# Patient Record
Sex: FEMALE | Race: WHITE | NOT HISPANIC OR LATINO | Employment: OTHER | URBAN - NONMETROPOLITAN AREA
[De-identification: names, ages, dates, MRNs, and addresses within clinical notes are randomized per-mention and may not be internally consistent; named-entity substitution may affect disease eponyms.]

---

## 2020-01-22 ENCOUNTER — OUTSIDE FACILITY SERVICE (OUTPATIENT)
Dept: CARDIOLOGY | Facility: CLINIC | Age: 70
End: 2020-01-22

## 2020-01-22 PROCEDURE — 93227 XTRNL ECG REC<48 HR R&I: CPT | Performed by: INTERNAL MEDICINE

## 2020-02-27 ENCOUNTER — OFFICE VISIT (OUTPATIENT)
Dept: CARDIOLOGY | Facility: CLINIC | Age: 70
End: 2020-02-27

## 2020-02-27 VITALS
SYSTOLIC BLOOD PRESSURE: 150 MMHG | DIASTOLIC BLOOD PRESSURE: 78 MMHG | HEART RATE: 59 BPM | HEIGHT: 60 IN | BODY MASS INDEX: 21.99 KG/M2 | WEIGHT: 112 LBS

## 2020-02-27 DIAGNOSIS — E78.00 HYPERCHOLESTEREMIA: ICD-10-CM

## 2020-02-27 DIAGNOSIS — I25.6 SILENT MYOCARDIAL ISCHEMIA: ICD-10-CM

## 2020-02-27 DIAGNOSIS — R09.89 BRUIT OF RIGHT CAROTID ARTERY: ICD-10-CM

## 2020-02-27 DIAGNOSIS — R01.1 MURMUR, CARDIAC: ICD-10-CM

## 2020-02-27 DIAGNOSIS — R42 DIZZINESS: ICD-10-CM

## 2020-02-27 DIAGNOSIS — I10 ESSENTIAL HYPERTENSION: ICD-10-CM

## 2020-02-27 DIAGNOSIS — E03.9 HYPOTHYROIDISM, UNSPECIFIED TYPE: ICD-10-CM

## 2020-02-27 DIAGNOSIS — R00.1 BRADYCARDIA, SINUS: Primary | ICD-10-CM

## 2020-02-27 PROCEDURE — 93000 ELECTROCARDIOGRAM COMPLETE: CPT | Performed by: INTERNAL MEDICINE

## 2020-02-27 PROCEDURE — 99204 OFFICE O/P NEW MOD 45 MIN: CPT | Performed by: INTERNAL MEDICINE

## 2020-02-27 RX ORDER — ATORVASTATIN CALCIUM 10 MG/1
10 TABLET, FILM COATED ORAL DAILY
COMMUNITY

## 2020-02-27 RX ORDER — LEVOTHYROXINE SODIUM 0.07 MG/1
75 TABLET ORAL DAILY
COMMUNITY
End: 2021-11-02

## 2020-02-27 RX ORDER — LISINOPRIL 5 MG/1
5 TABLET ORAL DAILY
COMMUNITY
End: 2020-03-20 | Stop reason: DRUGHIGH

## 2020-02-27 NOTE — PROGRESS NOTES
"Chief Complaint   Patient presents with   • Establish Care     Patient referred per PCP for Bradycardia. Had holter monitor at [facility] 1/17/2020.   • Blood pressure     Has had elevated B/P for the last 6 months, PCP started Lisinopril, B/P much better. She states \"when I lay down at night I can hear heart beating and like the blood going through artery\".   • Dizziness     She states \"I have only become dizzy 3-4 times in the last 5 years, but noticed with heat\".   • Aspirin     Patient does not take.        CARDIAC COMPLAINTS  Dizziness and Bradycardia      Subjective   Kenzie Escobar is a 69 y.o. female came in today for her initial cardiac evaluation.  She has history of hypothyroidism diagnosed many years ago and also has borderline hypertension.  She was seen at your office for a routine checkup at that time the blood pressure was elevated and was started on lisinopril.  At that time she also noticed that she has been having some occasional dizziness.  She had about 3-4 times in the last few years.  The first time it happened when she was in [place].  It was in the hot summer when she had the dizziness.  The last episode happened about 2 years ago again it was during the summertime.  The dizziness lasted for about 20 to 30 minutes.  It occurs suddenly she felt lightheaded had some mild nausea but did not throw up also had some diaphoresis and sat down for a while and after that the symptoms got better.  When she was seen at your office she noted to have bradycardia.  She had a Holter monitor placed where the average heart rate was about 66 and she did have some episodes of bradycardia but this happens mostly in the early morning while she was sleeping.  At night or in the evening her heart rate did go up.  She denies having any kind of palpitation.  She also underwent some lab work and found to have a normal TSH and T4 level.  Her blood count was normal.  Her renal function was normal.  Her cholesterol was 256 with " the LDL of 166.  She was started on Lipitor and so far she is tolerated well.  She denies having any kind of chest discomfort.  She denies having any shortness of breath.  She has history of smoking but did quit in 2018.  Prior to that she was smoking about a half a pack a day for the last 45 years.  She does have a family history of coronary artery disease as well as aneurysm in the brain.    Past Surgical History:   Procedure Laterality Date   • CONVERTED (HISTORICAL) HOLTER  2020    @ [facility]. [facility] 70 ..       Current Outpatient Medications   Medication Sig Dispense Refill   • atorvastatin (LIPITOR) 10 MG tablet Take 10 mg by mouth Daily.     • Calcium Carbonate-Vit D-Min (CALCIUM 1200 PO) Take  by mouth Daily.     • Cyanocobalamin (VITAMIN B12 PO) Take  by mouth. One tablet 5 times weekly.     • levothyroxine (SYNTHROID, LEVOTHROID) 75 MCG tablet Take 75 mcg by mouth Daily.     • lisinopril (PRINIVIL,ZESTRIL) 5 MG tablet Take 5 mg by mouth Daily.     • VITAMIN D PO Take  by mouth Daily.       No current facility-administered medications for this visit.            ALLERGIES:  Other and Sulfa antibiotics    Past Medical History:   Diagnosis Date   • Bradycardia    • H/O shoulder surgery     right   • Hyperlipidemia    • Hypertension    • Hypothyroidism        Social History     Tobacco Use   Smoking Status Former Smoker   • Packs/day: 0.50   • Years: 45.00   • Pack years: 22.50   • Types: Cigarettes   • Last attempt to quit:    • Years since quittin.1   Smokeless Tobacco Never Used          Family History   Problem Relation Age of Onset   • Hypertension Mother    • Cerebral aneurysm Mother    • Failure to thrive Father    • Heart disease Sister 73        CABG   • Heart attack Paternal Grandmother 80   • Prostate cancer Paternal Grandfather    • Cancer Sister    • No Known Problems Son        Review of Systems   Constitution: Positive for malaise/fatigue. Negative for decreased appetite.   HENT:  "Negative for congestion and sore throat.    Eyes: Negative for blurred vision.   Cardiovascular: Negative for chest pain and palpitations.   Respiratory: Positive for shortness of breath. Negative for snoring.    Endocrine: Negative for cold intolerance and heat intolerance.   Hematologic/Lymphatic: Negative for adenopathy. Does not bruise/bleed easily.   Skin: Negative for itching, nail changes and skin cancer.   Musculoskeletal: Negative for arthritis and myalgias.   Gastrointestinal: Negative for abdominal pain, dysphagia and heartburn.   Genitourinary: Negative for bladder incontinence and frequency.   Neurological: Positive for dizziness. Negative for light-headedness, seizures and vertigo.   Psychiatric/Behavioral: Negative for altered mental status.   Allergic/Immunologic: Negative for environmental allergies and hives.       Diabetes- No  Thyroid- abnormal    Objective     /78 (BP Location: Left arm)   Pulse 59   Ht 152.4 cm (60\")   Wt 50.8 kg (112 lb)   BMI 21.87 kg/m²     Physical Exam   Constitutional: She is oriented to person, place, and time. She appears well-developed and well-nourished.   HENT:   Head: Normocephalic.   Nose: Nose normal.   Eyes: Pupils are equal, round, and reactive to light. EOM are normal.   Neck: Normal range of motion. Neck supple.   Cardiovascular: Regular rhythm, S1 normal and S2 normal. Bradycardia present.   Murmur heard.  Pulmonary/Chest: Effort normal and breath sounds normal.   Abdominal: Soft. Bowel sounds are normal.   Musculoskeletal: Normal range of motion. She exhibits no edema.   Neurological: She is alert and oriented to person, place, and time.   Skin: Skin is warm and dry.   Psychiatric: She has a normal mood and affect.         ECG 12 Lead  Date/Time: 2/27/2020 1:47 PM  Performed by: Kervin Morrissey MD  Authorized by: Kervin Morrissey MD   Comparison: compared with previous ECG from 1/17/2020  Rhythm: sinus bradycardia  Rate: bradycardic  QRS " axis: normal    Clinical impression: non-specific ECG              Assessment/Plan   Patient's Body mass index is 21.87 kg/m². BMI is within normal parameters. No follow-up required..     Kenzie was seen today for establish care, blood pressure, dizziness and aspirin.    Diagnoses and all orders for this visit:    Bradycardia, sinus  -     Treadmill Stress Test; Future  -     Adult Transthoracic Echo Complete W/ Cont if Necessary Per Protocol; Future    Essential hypertension    Hypercholesteremia    Hypothyroidism, unspecified type    Dizziness  -     US Carotid Bilateral; Future    Bruit of right carotid artery  -     US Carotid Bilateral; Future    Silent myocardial ischemia  -     Treadmill Stress Test; Future    Murmur, cardiac  -     Adult Transthoracic Echo Complete W/ Cont if Necessary Per Protocol; Future       At baseline her heart rate is lower limit of normal.  Her blood pressure is elevated.  She apparently takes the lisinopril in the afternoon.  Her EKG shows sinus bradycardia with nonspecific ST changes normal FL interval no delta waves.  Her clinical examination reveals a BMI around 22.  She does have short systolic murmur at the mitral area lungs were clear extremities had no pedal edema and normal peripheral pulse.     Sinus bradycardia which was noted at your office and even now appears to be asymptomatic.  Her Holter monitor did not show any evidence of pauses or any significant bradycardia.  I did explain to her at this time.  I scheduled her to undergo a regular stress test to evaluate her functional status, chronotropic response and blood pressure response and also look for any stress-induced arrhythmia.    Regarding her blood pressure the blood it is still elevated.  She has not taken the medication correctly.  Strong advice was given to her about it.  In the stress test if her blood pressure goes up then she may need to go up on the medication dose.    Regarding her hypercholesterolemia she  has been tolerating the Lipitor properly.  At this time continue the same.  Recheck it in 1 to 2 months.    She has been having some occasional dizziness and there is a bruit of the right side.  I scheduled her to undergo a carotid ultrasound to evaluate the significance of it.    Regarding the heart murmur and the bradycardia, I scheduled her to undergo an echocardiogram to evaluate the LV function, LVH valvular structures as well as the PA pressure.    Based on the results, further recommendations will be made.             Electronically signed by Kervin Morrissey MD February 27, 2020 1:37 PM

## 2020-03-04 ENCOUNTER — HOSPITAL ENCOUNTER (OUTPATIENT)
Dept: CARDIOLOGY | Facility: HOSPITAL | Age: 70
Discharge: HOME | End: 2020-03-04

## 2020-03-04 ENCOUNTER — HOSPITAL ENCOUNTER (OUTPATIENT)
Dept: CARDIOLOGY | Facility: HOSPITAL | Age: 70
Discharge: HOME | End: 2020-03-04
Admitting: INTERNAL MEDICINE

## 2020-03-04 DIAGNOSIS — R00.1 BRADYCARDIA, SINUS: ICD-10-CM

## 2020-03-04 DIAGNOSIS — R42 DIZZINESS: ICD-10-CM

## 2020-03-04 DIAGNOSIS — I25.6 SILENT MYOCARDIAL ISCHEMIA: ICD-10-CM

## 2020-03-04 DIAGNOSIS — R01.1 MURMUR, CARDIAC: ICD-10-CM

## 2020-03-04 DIAGNOSIS — R09.89 BRUIT OF RIGHT CAROTID ARTERY: ICD-10-CM

## 2020-03-04 LAB
BH CV ECHO MEAS - ACS: 1.7 CM
BH CV ECHO MEAS - AO MAX PG: 11.3 MMHG
BH CV ECHO MEAS - AO MEAN PG: 6 MMHG
BH CV ECHO MEAS - AO ROOT AREA (BSA CORRECTED): 1.6
BH CV ECHO MEAS - AO ROOT AREA: 4.2 CM^2
BH CV ECHO MEAS - AO ROOT DIAM: 2.3 CM
BH CV ECHO MEAS - AO V2 MAX: 168 CM/SEC
BH CV ECHO MEAS - AO V2 MEAN: 118 CM/SEC
BH CV ECHO MEAS - AO V2 VTI: 35.6 CM
BH CV ECHO MEAS - BSA(HAYCOCK): 1.5 M^2
BH CV ECHO MEAS - BSA: 1.5 M^2
BH CV ECHO MEAS - BZI_BMI: 21.9 KILOGRAMS/M^2
BH CV ECHO MEAS - BZI_METRIC_HEIGHT: 152.4 CM
BH CV ECHO MEAS - BZI_METRIC_WEIGHT: 50.8 KG
BH CV ECHO MEAS - EDV(CUBED): 66.9 ML
BH CV ECHO MEAS - EDV(MOD-SP4): 35.5 ML
BH CV ECHO MEAS - EDV(TEICH): 72.5 ML
BH CV ECHO MEAS - EF(CUBED): 80.1 %
BH CV ECHO MEAS - EF(MOD-SP4): 63.9 %
BH CV ECHO MEAS - EF(TEICH): 73.1 %
BH CV ECHO MEAS - ESV(CUBED): 13.3 ML
BH CV ECHO MEAS - ESV(MOD-SP4): 12.8 ML
BH CV ECHO MEAS - ESV(TEICH): 19.5 ML
BH CV ECHO MEAS - FS: 41.6 %
BH CV ECHO MEAS - IVS/LVPW: 1.1
BH CV ECHO MEAS - IVSD: 0.92 CM
BH CV ECHO MEAS - LA DIMENSION: 2.5 CM
BH CV ECHO MEAS - LA/AO: 1.1
BH CV ECHO MEAS - LV DIASTOLIC VOL/BSA (35-75): 24.3 ML/M^2
BH CV ECHO MEAS - LV IVRT: 0.07 SEC
BH CV ECHO MEAS - LV MASS(C)D: 111.9 GRAMS
BH CV ECHO MEAS - LV MASS(C)DI: 76.7 GRAMS/M^2
BH CV ECHO MEAS - LV SYSTOLIC VOL/BSA (12-30): 8.8 ML/M^2
BH CV ECHO MEAS - LVIDD: 4.1 CM
BH CV ECHO MEAS - LVIDS: 2.4 CM
BH CV ECHO MEAS - LVLD AP4: 5.8 CM
BH CV ECHO MEAS - LVLS AP4: 4.5 CM
BH CV ECHO MEAS - LVOT AREA (M): 2.8 CM^2
BH CV ECHO MEAS - LVOT AREA: 2.8 CM^2
BH CV ECHO MEAS - LVOT DIAM: 1.9 CM
BH CV ECHO MEAS - LVPWD: 0.87 CM
BH CV ECHO MEAS - MV A MAX VEL: 69.4 CM/SEC
BH CV ECHO MEAS - MV DEC SLOPE: 247 CM/SEC^2
BH CV ECHO MEAS - MV E MAX VEL: 66 CM/SEC
BH CV ECHO MEAS - MV E/A: 0.95
BH CV ECHO MEAS - RVDD: 2 CM
BH CV ECHO MEAS - SI(AO): 101.4 ML/M^2
BH CV ECHO MEAS - SI(CUBED): 36.7 ML/M^2
BH CV ECHO MEAS - SI(MOD-SP4): 15.6 ML/M^2
BH CV ECHO MEAS - SI(TEICH): 36.3 ML/M^2
BH CV ECHO MEAS - SV(AO): 147.9 ML
BH CV ECHO MEAS - SV(CUBED): 53.6 ML
BH CV ECHO MEAS - SV(MOD-SP4): 22.7 ML
BH CV ECHO MEAS - SV(TEICH): 53 ML
BH CV STRESS DURATION MIN STAGE 1: 3
BH CV STRESS DURATION SEC STAGE 1: 0
BH CV STRESS GRADE STAGE 1: 10
BH CV STRESS METS STAGE 1: 5
BH CV STRESS PROTOCOL 1: NORMAL
BH CV STRESS RECOVERY BP: NORMAL MMHG
BH CV STRESS RECOVERY HR: 87 BPM
BH CV STRESS SPEED STAGE 1: 1.7
BH CV STRESS STAGE 1: 1
MAXIMAL PREDICTED HEART RATE: 153 BPM
MAXIMAL PREDICTED HEART RATE: 153 BPM
PERCENT MAX PREDICTED HR: 91.5 %
STRESS BASELINE BP: NORMAL MMHG
STRESS BASELINE HR: 64 BPM
STRESS PERCENT HR: 108 %
STRESS POST ESTIMATED WORKLOAD: 7 METS
STRESS POST EXERCISE DUR MIN: 6 MIN
STRESS POST EXERCISE DUR SEC: 30 SEC
STRESS POST PEAK BP: NORMAL MMHG
STRESS POST PEAK HR: 140 BPM
STRESS TARGET HR: 130 BPM
STRESS TARGET HR: 130 BPM

## 2020-03-04 PROCEDURE — 93306 TTE W/DOPPLER COMPLETE: CPT

## 2020-03-04 PROCEDURE — 93018 CV STRESS TEST I&R ONLY: CPT | Performed by: INTERNAL MEDICINE

## 2020-03-04 PROCEDURE — 93306 TTE W/DOPPLER COMPLETE: CPT | Performed by: INTERNAL MEDICINE

## 2020-03-04 PROCEDURE — 93880 EXTRACRANIAL BILAT STUDY: CPT

## 2020-03-04 PROCEDURE — 93880 EXTRACRANIAL BILAT STUDY: CPT | Performed by: RADIOLOGY

## 2020-03-04 PROCEDURE — 93017 CV STRESS TEST TRACING ONLY: CPT

## 2020-03-06 ENCOUNTER — TELEPHONE (OUTPATIENT)
Dept: CARDIOLOGY | Facility: CLINIC | Age: 70
End: 2020-03-06

## 2020-03-06 NOTE — TELEPHONE ENCOUNTER
Patient was made aware of results of stress and echo. Mild chronotropic response and mild increase in blood pressure response. She was made aware to increase lisinopril to 10 mg daily. Patient states that she has plenty of lisinopril 5 mg at this time and would like to call back to have new script sent when she is about to run out as she is going to take 2 tablets.

## 2020-03-20 ENCOUNTER — TELEPHONE (OUTPATIENT)
Dept: CARDIOLOGY | Facility: CLINIC | Age: 70
End: 2020-03-20

## 2020-03-20 RX ORDER — LISINOPRIL 10 MG/1
10 TABLET ORAL DAILY
Qty: 90 TABLET | Refills: 3 | Status: SHIPPED | OUTPATIENT
Start: 2020-03-20 | End: 2020-11-25

## 2020-08-19 ENCOUNTER — OFFICE VISIT (OUTPATIENT)
Dept: CARDIOLOGY | Facility: CLINIC | Age: 70
End: 2020-08-19

## 2020-08-19 VITALS
SYSTOLIC BLOOD PRESSURE: 130 MMHG | HEIGHT: 60 IN | DIASTOLIC BLOOD PRESSURE: 72 MMHG | BODY MASS INDEX: 23.16 KG/M2 | HEART RATE: 60 BPM | WEIGHT: 118 LBS | TEMPERATURE: 97.8 F

## 2020-08-19 DIAGNOSIS — E03.8 OTHER SPECIFIED HYPOTHYROIDISM: ICD-10-CM

## 2020-08-19 DIAGNOSIS — I10 ESSENTIAL HYPERTENSION: Primary | ICD-10-CM

## 2020-08-19 DIAGNOSIS — R00.2 PALPITATIONS: ICD-10-CM

## 2020-08-19 DIAGNOSIS — E78.00 HYPERCHOLESTEREMIA: ICD-10-CM

## 2020-08-19 PROCEDURE — 99213 OFFICE O/P EST LOW 20 MIN: CPT | Performed by: NURSE PRACTITIONER

## 2020-08-19 NOTE — PROGRESS NOTES
Chief Complaint   Patient presents with   • Follow-up     For cardiac management. Patient is not on aspirin. Last lab work was done about a month ago per PCP, not in chart.    • Med Refill     Does not need refills at this time. Brought medication list with visit.        Cardiac Complaints  none      Subjective   Kenzie Escobar is a 70 y.o. female with hyperlipidemia, palpitations, HTN, and hypothyroidism.  She was referred in February 2020 after she was seen at PCP for HTN response.  She also admitted to dizziness at that time.  She described dizzy spells as lasting for 20-30 minutes accompanied by light headedness and nausea. At consult, she had admitted to wearing a holter monitor which showed NSR with an average HR of 66 and a few episodes of bradycardia while sleeping. Carotid US advised at consult to assess for any significant stenosis was negative for any flow limiting stenosis bilaterally.  Stress and echo ordered showed no significant valvular concerns, no LV dysfunction, and no ischemic burden.     She returns today for follow up and reports doing well. Cardiac concerns are denied. No chest pain, SOA, palpitations, dizziness, or syncope reported. She admits to active lifestyle at home without concerns. Labs remain followed by PCP and were last checked about a month ago, no current available. No refills needed.         Cardiac History  Past Surgical History:   Procedure Laterality Date   • CARDIOVASCULAR STRESS TEST  03/04/2020    R.Stress- 6 Min. 30 Secs. 7.0 METS. 92% THR. BP- 184/71. Negative.   • CONVERTED (HISTORICAL) HOLTER  01/17/2020    @ [facility]. [facility] 70 ..   • ECHO - CONVERTED  03/04/2020    EF 65%. Mild MR & AI.    • US CAROTID UNILATERAL  03/05/2020    No sig lesions.       Current Outpatient Medications   Medication Sig Dispense Refill   • atorvastatin (LIPITOR) 10 MG tablet Take 10 mg by mouth Daily.     • Calcium Carbonate-Vit D-Min (CALCIUM 1200 PO) Take  by mouth Daily.     • Cyanocobalamin  (VITAMIN B12 PO) Take  by mouth. One tablet 5 times weekly.     • levothyroxine (SYNTHROID, LEVOTHROID) 75 MCG tablet Take 75 mcg by mouth Daily.     • lisinopril (PRINIVIL,ZESTRIL) 10 MG tablet Take 1 tablet by mouth Daily. 90 tablet 3   • VITAMIN D PO Take  by mouth Daily.       No current facility-administered medications for this visit.        Other and Sulfa antibiotics    Past Medical History:   Diagnosis Date   • Bradycardia    • H/O shoulder surgery 2014    right   • Hyperlipidemia    • Hypertension    • Hypothyroidism        Social History     Socioeconomic History   • Marital status:      Spouse name: Not on file   • Number of children: Not on file   • Years of education: Not on file   • Highest education level: Not on file   Tobacco Use   • Smoking status: Former Smoker     Packs/day: 0.50     Years: 45.00     Pack years: 22.50     Types: Cigarettes     Last attempt to quit: 2018     Years since quittin.6   • Smokeless tobacco: Never Used   Substance and Sexual Activity   • Alcohol use: Yes     Comment: occasional glass of wine   • Drug use: Never       Family History   Problem Relation Age of Onset   • Hypertension Mother    • Cerebral aneurysm Mother    • Failure to thrive Father    • Heart disease Sister 73        CABG   • Heart attack Paternal Grandmother 80   • Prostate cancer Paternal Grandfather    • Cancer Sister    • No Known Problems Son        Review of Systems   Constitution: Negative for malaise/fatigue and night sweats.   Cardiovascular: Negative for chest pain, claudication, dyspnea on exertion, irregular heartbeat, leg swelling, near-syncope, orthopnea, palpitations and syncope.   Respiratory: Negative for cough, shortness of breath and wheezing.    Musculoskeletal: Positive for stiffness. Negative for back pain and joint pain.   Gastrointestinal: Negative for anorexia, heartburn, melena, nausea and vomiting.   Genitourinary: Negative for dysuria, hematuria, hesitancy and  "nocturia.   Neurological: Negative for dizziness, light-headedness and loss of balance.   Psychiatric/Behavioral: Negative for depression and memory loss. The patient is not nervous/anxious.            Objective     /72 (BP Location: Left arm)   Pulse 60   Temp 97.8 °F (36.6 °C)   Ht 152.4 cm (60\")   Wt 53.5 kg (118 lb)   BMI 23.05 kg/m²     Physical Exam   Constitutional: She is oriented to person, place, and time. She appears well-developed and well-nourished.   HENT:   Head: Normocephalic and atraumatic.   Eyes: Pupils are equal, round, and reactive to light. EOM are normal.   Neck: Normal range of motion. Neck supple.   Cardiovascular: Normal rate and regular rhythm.   Murmur heard.  Pulmonary/Chest: Effort normal and breath sounds normal.   Abdominal: Soft.   Musculoskeletal: Normal range of motion.   Neurological: She is alert and oriented to person, place, and time.   Skin: Skin is warm and dry.   Psychiatric: She has a normal mood and affect. Her behavior is normal.       Procedures    Assessment/Plan     HTN:  Blood pressure stable. Lisinopril therapy continued at current. Limited sodium intake advised.    Increased chronotropic response:  HR stable.     Hyperlipidemia:  Statin therapy continued, on lipitor therapy.  Tolerance reported, same continued.  FLP followed by PCP.    Cardiac status:  Stable. Most recent workup showed no ischemia, good LV function, and normal valve areas.     BMI stable at 23.05 continued cardiac diet advised.    No refills needed.    6 month follow up recommended or sooner if needed.        Problems Addressed this Visit        Cardiovascular and Mediastinum    Hypercholesteremia    Essential hypertension - Primary       Endocrine    Hypothyroidism      Other Visit Diagnoses     Palpitations              Patient's Body mass index is 23.05 kg/m². BMI is within normal parameters. No follow-up required..                Electronically signed by Arlene Arreaga, APRN August " 20, 2020 15:59

## 2020-11-25 RX ORDER — LISINOPRIL 10 MG/1
TABLET ORAL
Qty: 90 TABLET | Refills: 3 | Status: SHIPPED | OUTPATIENT
Start: 2020-11-25 | End: 2021-04-14 | Stop reason: SDUPTHER

## 2021-04-14 ENCOUNTER — OFFICE VISIT (OUTPATIENT)
Dept: CARDIOLOGY | Facility: CLINIC | Age: 71
End: 2021-04-14

## 2021-04-14 VITALS
HEIGHT: 60 IN | BODY MASS INDEX: 23.6 KG/M2 | SYSTOLIC BLOOD PRESSURE: 120 MMHG | TEMPERATURE: 97.5 F | DIASTOLIC BLOOD PRESSURE: 60 MMHG | WEIGHT: 120.2 LBS | HEART RATE: 56 BPM

## 2021-04-14 DIAGNOSIS — E78.00 HYPERCHOLESTEREMIA: Primary | ICD-10-CM

## 2021-04-14 DIAGNOSIS — I34.0 NONRHEUMATIC MITRAL VALVE REGURGITATION: ICD-10-CM

## 2021-04-14 DIAGNOSIS — R42 DIZZINESS: ICD-10-CM

## 2021-04-14 DIAGNOSIS — I10 ESSENTIAL HYPERTENSION: ICD-10-CM

## 2021-04-14 PROCEDURE — 99213 OFFICE O/P EST LOW 20 MIN: CPT | Performed by: NURSE PRACTITIONER

## 2021-04-14 RX ORDER — LISINOPRIL 10 MG/1
10 TABLET ORAL DAILY
Qty: 90 TABLET | Refills: 3 | Status: SHIPPED | OUTPATIENT
Start: 2021-04-14 | End: 2021-11-02 | Stop reason: SDUPTHER

## 2021-04-14 NOTE — PROGRESS NOTES
Chief Complaint   Patient presents with   • Follow-up     Cardiac management   • Lab     Last labs in chart. Had last covid vaccine 4/9/21.   • Med Refill     Needs refills on Lisinopril-90 day.   • Aspirin     She is asking if needs to start taking 81 mg?       Subjective       Kenzie Escobar is a 70 y.o. female with hyperlipidemia, palpitations, HTN, and hypothyroidism.  She was referred in February 2020 after she was seen at PCP for HTN.  She also admitted to dizziness at that time.  She described dizzy spells as lasting for 20-30 minutes accompanied by light headedness and nausea. At consult, she had admitted to wearing a holter monitor which showed NSR with an average HR of 66 and a few episodes of bradycardia while sleeping. Carotid US advised at consult to assess for any significant stenosis was negative for any flow limiting stenosis bilaterally.  Stress and echo ordered revealing no ischemia, normal EF, mild MR and AI.       She returns today for follow-up visit with her .  She denies cardiac symptoms.  Blood pressure remains well controlled with lisinopril.  She also tolerates low-dose Lipitor afterLipids were elevated in January 2020 with , HDL 66, , .  She plans to see PCP soon for repeat labs.  She had some questions about aspirin therapy.         Cardiac History:    Past Surgical History:   Procedure Laterality Date   • CARDIOVASCULAR STRESS TEST  03/04/2020    R.Stress- 6 Min. 30 Secs. 7.0 METS. 92% THR. BP- 184/71. Negative.   • CONVERTED (HISTORICAL) HOLTER  01/17/2020    @ [facility]. [facility] 70 ..   • ECHO - CONVERTED  03/04/2020    EF 65%. Mild MR & AI.    • US CAROTID UNILATERAL  03/05/2020    No sig lesions.       Current Outpatient Medications   Medication Sig Dispense Refill   • atorvastatin (LIPITOR) 10 MG tablet Take 10 mg by mouth Daily.     • Calcium Carbonate-Vit D-Min (CALCIUM 1200 PO) Take  by mouth Daily.     • Cyanocobalamin (VITAMIN B12 PO) One tablet 5 times  weekly.      • levothyroxine (SYNTHROID, LEVOTHROID) 75 MCG tablet Take 75 mcg by mouth Daily.     • lisinopril (PRINIVIL,ZESTRIL) 10 MG tablet Take 1 tablet by mouth Daily. 90 tablet 3     No current facility-administered medications for this visit.       Other and Sulfa antibiotics    Past Medical History:   Diagnosis Date   • Bradycardia    • H/O shoulder surgery 2014    right   • Hyperlipidemia    • Hypertension    • Hypothyroidism      Social History     Socioeconomic History   • Marital status:      Spouse name: Not on file   • Number of children: Not on file   • Years of education: Not on file   • Highest education level: Not on file   Tobacco Use   • Smoking status: Former Smoker     Packs/day: 0.50     Years: 45.00     Pack years: 22.50     Types: Cigarettes     Quit date: 2018     Years since quitting: 3.2   • Smokeless tobacco: Never Used   Vaping Use   • Vaping Use: Never used   Substance and Sexual Activity   • Alcohol use: Yes     Comment: occasional glass of wine   • Drug use: Never     Family History   Problem Relation Age of Onset   • Hypertension Mother    • Cerebral aneurysm Mother    • Failure to thrive Father    • Heart disease Sister 73        CABG   • Heart attack Paternal Grandmother 80   • Prostate cancer Paternal Grandfather    • Cancer Sister    • No Known Problems Son      Review of Systems   Constitutional: Negative for decreased appetite and malaise/fatigue.   HENT: Negative.    Eyes: Negative for blurred vision.   Cardiovascular: Negative for chest pain, dyspnea on exertion, leg swelling, palpitations and syncope.   Respiratory: Negative for shortness of breath and sleep disturbances due to breathing.    Endocrine: Negative.    Hematologic/Lymphatic: Negative for bleeding problem. Does not bruise/bleed easily.   Skin: Negative.    Musculoskeletal: Negative for falls and myalgias.   Gastrointestinal: Negative for abdominal pain, heartburn and melena.   Genitourinary: Negative for  "hematuria.   Neurological: Negative for dizziness and light-headedness.   Psychiatric/Behavioral: Negative for altered mental status.   Allergic/Immunologic: Negative.       Objective     /60 (BP Location: Right arm)   Pulse 56   Temp 97.5 °F (36.4 °C)   Ht 152.4 cm (60\")   Wt 54.5 kg (120 lb 3.2 oz)   BMI 23.47 kg/m²     Vitals and nursing note reviewed.   Constitutional:       General: Not in acute distress.     Appearance: Well-developed. Not diaphoretic.   Eyes:      Pupils: Pupils are equal, round, and reactive to light.   HENT:      Head: Normocephalic.   Pulmonary:      Effort: Pulmonary effort is normal. No respiratory distress.      Breath sounds: Normal breath sounds.   Cardiovascular:      Normal rate. Regular rhythm.   Pulses:     Intact distal pulses.   Edema:     Peripheral edema absent.   Abdominal:      General: Bowel sounds are normal.      Palpations: Abdomen is soft.   Musculoskeletal: Normal range of motion.      Cervical back: Normal range of motion. Skin:     General: Skin is warm and dry.   Neurological:      Mental Status: Alert and oriented to person, place, and time.        Procedures          Problem List Items Addressed This Visit        Cardiac and Vasculature    Hypercholesteremia - Primary    Essential hypertension    Relevant Medications    lisinopril (PRINIVIL,ZESTRIL) 10 MG tablet    Nonrheumatic mitral valve regurgitation       Symptoms and Signs    Dizziness         1.  HTN-very well controlled with lisinopril 10 mg.  Continue the same.  Refill sent.  Limit sodium.  BMI is normal.    2.  Hyperlipidemia-Lipitor 10 mg added for .  She is tolerating statin therapy without side effects.  She plans to see PCP soon for repeat labs.  Could we get a copy?    3.  Dizziness-resolved, Holter, echo, carotid ultrasound showed no significant findings to explain dizziness.    We reviewed the findings of her cardiac work-up.  She appears to be well controlled.  No changes made.  " Follow-up in 6 months with her .    Patient's Body mass index is 23.47 kg/m². BMI is normal.               Electronically signed by IRWIN Kuo,  April 14, 2021 17:10 EDT

## 2021-11-02 ENCOUNTER — OFFICE VISIT (OUTPATIENT)
Dept: CARDIOLOGY | Facility: CLINIC | Age: 71
End: 2021-11-02

## 2021-11-02 VITALS
WEIGHT: 120.2 LBS | HEIGHT: 60 IN | DIASTOLIC BLOOD PRESSURE: 64 MMHG | HEART RATE: 60 BPM | SYSTOLIC BLOOD PRESSURE: 110 MMHG | BODY MASS INDEX: 23.6 KG/M2 | TEMPERATURE: 97.5 F

## 2021-11-02 DIAGNOSIS — E78.00 HYPERCHOLESTEREMIA: Primary | ICD-10-CM

## 2021-11-02 DIAGNOSIS — R42 DIZZINESS: ICD-10-CM

## 2021-11-02 DIAGNOSIS — I10 ESSENTIAL HYPERTENSION: ICD-10-CM

## 2021-11-02 DIAGNOSIS — I34.0 NONRHEUMATIC MITRAL VALVE REGURGITATION: ICD-10-CM

## 2021-11-02 PROCEDURE — 99213 OFFICE O/P EST LOW 20 MIN: CPT | Performed by: NURSE PRACTITIONER

## 2021-11-02 RX ORDER — LISINOPRIL 10 MG/1
10 TABLET ORAL DAILY
Qty: 90 TABLET | Refills: 3 | Status: SHIPPED | OUTPATIENT
Start: 2021-11-02 | End: 2022-12-18 | Stop reason: SDUPTHER

## 2021-11-02 RX ORDER — LEVOTHYROXINE SODIUM 0.07 MG/1
75 TABLET ORAL DAILY
COMMUNITY

## 2021-11-02 NOTE — PROGRESS NOTES
Chief Complaint   Patient presents with   • Follow-up     Cardiac management   • Lab     Last labs 2-3 months ago per PCP. Has carotid US and CT of head, with normal results.   • Dizziness     Will have occasional dizziness when she lays down. She reports having one episode while raising arms that she saw a flashing light with beat of her heart.    • Med Refill     Needs refills on Lisinopril-90 day.     Subjective       Kenzie Escobar is a 71 y.o. female with HTN, hyperlipidemia, palpitations, and hypothyroidism.  She was referred in February 2020 after she was seen at PCP for HTN.  She also admitted to dizziness at that time.  She described dizzy spells as lasting for 20-30 minutes accompanied by light headedness and nausea. At consult, she reported wearing holter monitor which showed NSR with an average HR of 66 and a few episodes of bradycardia while sleeping. Carotid US advised showed no flow-limiting stenosis bilaterally.  Stress and echo revealed no ischemia, normal EF, mild MR and AI.       She returns today for follow-up with her .  She was seen recently at [facility] for dizziness.  Carotid ultrasound repeated and CT head reported as normal.  She had an episode of flashing light to the periphery of right thigh while arms raised over head with heart beating in her ear.  Questioning if this could be cardiac related.  Denies chest pain, shortness of breath.  Lipids in 2020 , HDL 66, , .  Lipitor started at that time.         Cardiac History:    Past Surgical History:   Procedure Laterality Date   • CARDIOVASCULAR STRESS TEST  03/04/2020    R.Stress- 6 Min. 30 Secs. 7.0 METS. 92% THR. BP- 184/71. Negative.   • CONVERTED (HISTORICAL) HOLTER  01/17/2020    @ [facility]. AVG 68 ..   • ECHO - CONVERTED  03/04/2020    EF 65%. Mild MR & AI.    • US CAROTID UNILATERAL  03/05/2020    No sig lesions.     Current Outpatient Medications   Medication Sig Dispense Refill   • atorvastatin (LIPITOR) 10 MG  tablet Take 10 mg by mouth Daily.     • Cyanocobalamin (VITAMIN B12 PO) One tablet once weekly.     • levothyroxine (SYNTHROID, LEVOTHROID) 50 MCG tablet Take 50 mcg by mouth Daily.     • lisinopril (PRINIVIL,ZESTRIL) 10 MG tablet Take 1 tablet by mouth Daily. 90 tablet 3     No current facility-administered medications for this visit.     Other and Sulfa antibiotics    Past Medical History:   Diagnosis Date   • Bradycardia    • H/O shoulder surgery 2014    right   • Hyperlipidemia    • Hypertension    • Hypothyroidism      Social History     Socioeconomic History   • Marital status:    Tobacco Use   • Smoking status: Former Smoker     Packs/day: 0.50     Years: 45.00     Pack years: 22.50     Types: Cigarettes     Quit date: 2018     Years since quitting: 3.8   • Smokeless tobacco: Never Used   Vaping Use   • Vaping Use: Never used   Substance and Sexual Activity   • Alcohol use: Yes     Comment: occasional glass of wine   • Drug use: Never     Family History   Problem Relation Age of Onset   • Hypertension Mother    • Cerebral aneurysm Mother    • Failure to thrive Father    • Heart disease Sister 73        CABG   • Heart attack Paternal Grandmother 80   • Prostate cancer Paternal Grandfather    • Cancer Sister    • No Known Problems Son      Review of Systems   Constitutional: Negative for decreased appetite, malaise/fatigue and weight loss.   HENT: Negative.    Eyes: Positive for visual disturbance (Flashing light). Negative for blurred vision.   Cardiovascular: Negative for chest pain, dyspnea on exertion, leg swelling, palpitations and syncope.   Respiratory: Negative for shortness of breath and sleep disturbances due to breathing.    Endocrine: Negative.    Hematologic/Lymphatic: Negative for bleeding problem. Does not bruise/bleed easily.   Skin: Negative.    Musculoskeletal: Negative for falls and myalgias.   Gastrointestinal: Negative for abdominal pain, heartburn and melena.   Genitourinary:  "Negative for hematuria.   Neurological: Positive for dizziness. Negative for light-headedness.   Psychiatric/Behavioral: Negative for altered mental status.   Allergic/Immunologic: Negative.       Objective     /64 (BP Location: Right arm)   Pulse 60   Temp 97.5 °F (36.4 °C)   Ht 152.4 cm (60\")   Wt 54.5 kg (120 lb 3.2 oz)   BMI 23.47 kg/m²     Vitals and nursing note reviewed.   Constitutional:       General: Not in acute distress.     Appearance: Well-developed. Not diaphoretic.   Eyes:      Pupils: Pupils are equal, round, and reactive to light.   HENT:      Head: Normocephalic.   Pulmonary:      Effort: Pulmonary effort is normal. No respiratory distress.      Breath sounds: Normal breath sounds.   Cardiovascular:      Normal rate. Regular rhythm.      Murmurs: There is a grade 1/6 systolic murmur at the apex.   Pulses:     Intact distal pulses.   Edema:     Peripheral edema absent.   Abdominal:      General: Bowel sounds are normal.      Palpations: Abdomen is soft.   Musculoskeletal: Normal range of motion.      Cervical back: Normal range of motion. Skin:     General: Skin is warm and dry.   Neurological:      Mental Status: Alert and oriented to person, place, and time.        Procedures          Problem List Items Addressed This Visit        Cardiac and Vasculature    Hypercholesteremia - Primary    Essential hypertension    Relevant Medications    lisinopril (PRINIVIL,ZESTRIL) 10 MG tablet    Nonrheumatic mitral valve regurgitation       Symptoms and Signs    Dizziness         1.  HTN- well controlled with lisinopril 10 mg.  Continue the same.  Refill sent.  Limit sodium.  BMI is normal.     2.  Hyperlipidemia-Lipitor 10 mg for .  Tolerating well.  PCP repeated labs.  Could we get copy?     3.  Dizziness-  Mildly persistent but improved. No cause identified, patient reports CT head, carotid ultrasound repeated.  Will try to obtain reports from [facility].  She describes flashing light to right " peripheral vision.  Advised to have dilated eye exam.  Questionable TIA.  She is going to add low-dose aspirin.  If easy bruising noted, can take every other day.       No changes made today. Follow-up in 6 months with her .    Patient's Body mass index is 23.47 kg/m². indicating that she is within normal range (BMI 18.5-24.9). No BMI management plan needed..               Electronically signed by IRWIN Kuo,  November 5, 2021 10:26 EDT

## 2021-11-05 ENCOUNTER — TELEPHONE (OUTPATIENT)
Dept: CARDIOLOGY | Facility: CLINIC | Age: 71
End: 2021-11-05

## 2021-11-05 NOTE — TELEPHONE ENCOUNTER
Can we see what scan she had at [facility] recently-she was there with dizziness    CTA?  Or CT of head

## 2021-11-08 NOTE — TELEPHONE ENCOUNTER
Can we let Kenzie know CT of head looks normal, mild atherosclerotic plaque to carotids bilaterally.    If she can tolerate low-dose aspirin daily or every other day, would recommend this.

## 2021-11-08 NOTE — TELEPHONE ENCOUNTER
Patient made aware CT of head results reviewed, aware of results and recommendations if she can tolerate low dose aspirin daily or every other day. Patient verbalized understanding and reports she has already started aspirin 81mg every other day.

## 2022-05-23 ENCOUNTER — OFFICE VISIT (OUTPATIENT)
Dept: CARDIOLOGY | Facility: CLINIC | Age: 72
End: 2022-05-23

## 2022-05-23 VITALS — DIASTOLIC BLOOD PRESSURE: 70 MMHG | HEART RATE: 60 BPM | SYSTOLIC BLOOD PRESSURE: 110 MMHG

## 2022-05-23 DIAGNOSIS — R42 DIZZINESS: ICD-10-CM

## 2022-05-23 DIAGNOSIS — R55 SYNCOPE AND COLLAPSE: ICD-10-CM

## 2022-05-23 DIAGNOSIS — R01.1 MURMUR, CARDIAC: ICD-10-CM

## 2022-05-23 DIAGNOSIS — I10 ESSENTIAL HYPERTENSION: ICD-10-CM

## 2022-05-23 DIAGNOSIS — I34.0 NONRHEUMATIC MITRAL VALVE REGURGITATION: ICD-10-CM

## 2022-05-23 DIAGNOSIS — E78.00 HYPERCHOLESTEREMIA: Primary | ICD-10-CM

## 2022-05-23 DIAGNOSIS — R06.02 SHORTNESS OF BREATH: ICD-10-CM

## 2022-05-23 PROCEDURE — 99214 OFFICE O/P EST MOD 30 MIN: CPT | Performed by: NURSE PRACTITIONER

## 2022-05-23 RX ORDER — ASPIRIN 81 MG/1
81 TABLET ORAL DAILY
COMMUNITY
End: 2022-12-06

## 2022-05-23 NOTE — PROGRESS NOTES
Chief Complaint   Patient presents with   • Follow-up     Pt is here for cardiac follow up.  She denies CP, SOB, dizziness or palpitations.  She does take a daily ASA.   • Med Refill     Pt request 90 day refills to be sent to [facility].  Pt's medications were verified by pt provided medication list.   • Lab Work     Pt states that her last labs were about a year ago.     Subjective       Kenzie Escobar is a 71 y.o. female with HTN, hyperlipidemia, palpitations, and hypothyroidism.  She was referred in February 2020 after she was seen at PCP for HTN.  She also admitted to dizziness at that time.  She described dizzy spells as lasting for 20-30 minutes accompanied by light headedness and nausea. At consult, she reported wearing holter monitor which showed NSR with an average HR of 66 and a few episodes of bradycardia while sleeping. Carotid US advised showed no flow-limiting stenosis bilaterally.  Stress and echo revealed no ischemia, normal EF, mild MR and AI.  She had symptoms described as a TIA/visual changes in August 2021.  CT head and carotid ultrasound showed no acute changes.      She returns today for follow up visit with her . She denies chest pain, dyspnea, palpitations. She had a near syncopal episode while she was outdoors in the heat with a friend. She became light-headed and weak, felt to be overheated and did not eat/drink properly prior to the episode. Labs followed by PCP.          Cardiac History:    Past Surgical History:   Procedure Laterality Date   • CARDIOVASCULAR STRESS TEST  03/04/2020    R.Stress- 6 Min. 30 Secs. 7.0 METS. 92% THR. BP- 184/71. Negative.   • CONVERTED (HISTORICAL) HOLTER  01/17/2020    @ [facility]. AVG 68 ..   • ECHO - CONVERTED  03/04/2020    EF 65%. Mild MR & AI.    • OTHER SURGICAL HISTORY  08/23/2021    CT head-negative   • US CAROTID UNILATERAL  03/05/2020    No sig lesions.   • US CAROTID UNILATERAL  08/23/2021    Mild plaque bilaterally     Current Outpatient  Medications   Medication Sig Dispense Refill   • aspirin 81 MG EC tablet Take 81 mg by mouth Daily.     • atorvastatin (LIPITOR) 10 MG tablet Take 10 mg by mouth Daily.     • levothyroxine (SYNTHROID, LEVOTHROID) 75 MCG tablet Take 75 mcg by mouth Daily.     • lisinopril (PRINIVIL,ZESTRIL) 10 MG tablet Take 1 tablet by mouth Daily. 90 tablet 3     No current facility-administered medications for this visit.     Other and Sulfa antibiotics    Past Medical History:   Diagnosis Date   • Bradycardia    • H/O shoulder surgery 2014    right   • Hyperlipidemia    • Hypertension    • Hypothyroidism      Social History     Socioeconomic History   • Marital status:    Tobacco Use   • Smoking status: Former Smoker     Packs/day: 0.50     Years: 45.00     Pack years: 22.50     Types: Cigarettes     Quit date: 2018     Years since quittin.4   • Smokeless tobacco: Never Used   Vaping Use   • Vaping Use: Never used   Substance and Sexual Activity   • Alcohol use: Not Currently     Comment: occasional glass of wine   • Drug use: Never   • Sexual activity: Not Currently     Partners: Male     Birth control/protection: Post-menopausal     Family History   Problem Relation Age of Onset   • Hypertension Mother    • Cerebral aneurysm Mother    • Heart disease Mother         Had a brain aneurysm.   • Failure to thrive Father    • Heart disease Sister 73   • Heart attack Paternal Grandmother 80   • Prostate cancer Paternal Grandfather    • Cancer Sister    • No Known Problems Son      Review of Systems   Constitutional: Negative for decreased appetite and malaise/fatigue.   HENT: Negative.    Eyes: Negative for blurred vision.   Cardiovascular: Negative for chest pain, dyspnea on exertion, leg swelling, palpitations and syncope.   Respiratory: Negative for shortness of breath and sleep disturbances due to breathing.    Endocrine: Negative.    Hematologic/Lymphatic: Negative for bleeding problem. Does not bruise/bleed  easily.   Skin: Negative.    Musculoskeletal: Negative for falls and myalgias.   Gastrointestinal: Negative for abdominal pain, heartburn and melena.   Genitourinary: Negative for hematuria.   Neurological: Positive for light-headedness. Negative for dizziness.   Psychiatric/Behavioral: Negative for altered mental status.   Allergic/Immunologic: Negative.       Objective     /70 (BP Location: Left arm, Patient Position: Sitting)   Pulse 60     Vitals and nursing note reviewed.   Constitutional:       General: Not in acute distress.     Appearance: Well-developed. Not diaphoretic.   Eyes:      Pupils: Pupils are equal, round, and reactive to light.   HENT:      Head: Normocephalic.   Pulmonary:      Effort: Pulmonary effort is normal. No respiratory distress.      Breath sounds: Normal breath sounds.   Cardiovascular:      Normal rate. Regular rhythm.      Murmurs: There is a grade 1 to 2/6 systolic murmur.   Pulses:     Intact distal pulses.   Edema:     Peripheral edema absent.   Abdominal:      General: Bowel sounds are normal.      Palpations: Abdomen is soft.   Musculoskeletal: Normal range of motion.      Cervical back: Normal range of motion. Skin:     General: Skin is warm and dry.   Neurological:      Mental Status: Alert and oriented to person, place, and time.        Procedures          Problem List Items Addressed This Visit        Cardiac and Vasculature    Murmur, cardiac    Hypercholesteremia - Primary    Relevant Orders    US Carotid Bilateral    Essential hypertension    Relevant Orders    US Carotid Bilateral    Adult Transthoracic Echo Complete W/ Cont if Necessary Per Protocol    Nonrheumatic mitral valve regurgitation    Relevant Orders    US Carotid Bilateral    Adult Transthoracic Echo Complete W/ Cont if Necessary Per Protocol       Symptoms and Signs    Dizziness    Relevant Orders    US Carotid Bilateral    Adult Transthoracic Echo Complete W/ Cont if Necessary Per Protocol      Other  Visit Diagnoses     Shortness of breath        Relevant Orders    US Carotid Bilateral    Adult Transthoracic Echo Complete W/ Cont if Necessary Per Protocol    Syncope and collapse        Relevant Orders    US Carotid Bilateral    Adult Transthoracic Echo Complete W/ Cont if Necessary Per Protocol         1.  HTN- well controlled with lisinopril 10 mg.  Continue the same.  Refill sent.  Limit sodium.  BMI is normal.     2.  Hyperlipidemia-Lipitor 10 mg for .  Tolerating well.  PCP follows labs.       3.  Dizziness/near syncope- recent episode appears to be heat related, ?vasovagal hypotension. Repeat carotid US to rule out any worsening stenosis (mild last year). Repeat echocardiogram to evaluate LVEF, AR and MR. Adequate fluid hydration and regular caloric intake.      Further recommendations to follow echo and carotid US. No changes made today. Follow-up in 6 months with her .                 Electronically signed by IRWIN Kuo,  May 27, 2022 10:40 EDT

## 2022-07-07 ENCOUNTER — APPOINTMENT (OUTPATIENT)
Dept: CARDIOLOGY | Facility: HOSPITAL | Age: 72
End: 2022-07-07

## 2022-08-16 ENCOUNTER — HOSPITAL ENCOUNTER (OUTPATIENT)
Dept: CARDIOLOGY | Facility: HOSPITAL | Age: 72
Discharge: HOME | End: 2022-08-16

## 2022-08-16 DIAGNOSIS — R42 DIZZINESS: ICD-10-CM

## 2022-08-16 DIAGNOSIS — I10 ESSENTIAL HYPERTENSION: ICD-10-CM

## 2022-08-16 DIAGNOSIS — R55 SYNCOPE AND COLLAPSE: ICD-10-CM

## 2022-08-16 DIAGNOSIS — E78.00 HYPERCHOLESTEREMIA: ICD-10-CM

## 2022-08-16 DIAGNOSIS — I34.0 NONRHEUMATIC MITRAL VALVE REGURGITATION: ICD-10-CM

## 2022-08-16 DIAGNOSIS — R06.02 SHORTNESS OF BREATH: ICD-10-CM

## 2022-08-16 LAB
BH CV ECHO MEAS - ACS: 1.66 CM
BH CV ECHO MEAS - AI P1/2T: 922.6 MSEC
BH CV ECHO MEAS - AO MAX PG: 9.2 MMHG
BH CV ECHO MEAS - AO MEAN PG: 4.9 MMHG
BH CV ECHO MEAS - AO ROOT DIAM: 2.49 CM
BH CV ECHO MEAS - AO V2 MAX: 152 CM/SEC
BH CV ECHO MEAS - AO V2 VTI: 41 CM
BH CV ECHO MEAS - EDV(CUBED): 65.5 ML
BH CV ECHO MEAS - EDV(MOD-SP4): 33.3 ML
BH CV ECHO MEAS - EF(MOD-SP4): 49.5 %
BH CV ECHO MEAS - EF_3D-VOL: 66 %
BH CV ECHO MEAS - ESV(CUBED): 11.4 ML
BH CV ECHO MEAS - ESV(MOD-SP4): 16.8 ML
BH CV ECHO MEAS - FS: 44.2 %
BH CV ECHO MEAS - IVS/LVPW: 0.8 CM
BH CV ECHO MEAS - IVSD: 0.82 CM
BH CV ECHO MEAS - LA DIMENSION: 2.8 CM
BH CV ECHO MEAS - LAT PEAK E' VEL: 8.6 CM/SEC
BH CV ECHO MEAS - LV DIASTOLIC VOL/BSA (35-75): 22.2 CM2
BH CV ECHO MEAS - LV MASS(C)D: 115.3 GRAMS
BH CV ECHO MEAS - LV SYSTOLIC VOL/BSA (12-30): 11.2 CM2
BH CV ECHO MEAS - LVIDD: 4 CM
BH CV ECHO MEAS - LVIDS: 2.25 CM
BH CV ECHO MEAS - LVOT AREA: 3.1 CM2
BH CV ECHO MEAS - LVOT DIAM: 1.99 CM
BH CV ECHO MEAS - LVPWD: 1.03 CM
BH CV ECHO MEAS - MED PEAK E' VEL: 7.2 CM/SEC
BH CV ECHO MEAS - MV A MAX VEL: 60.8 CM/SEC
BH CV ECHO MEAS - MV DEC SLOPE: 215.9 CM/SEC2
BH CV ECHO MEAS - MV E MAX VEL: 65.6 CM/SEC
BH CV ECHO MEAS - MV E/A: 1.08
BH CV ECHO MEAS - RVDD: 2.43 CM
BH CV ECHO MEAS - SI(MOD-SP4): 11 ML/M2
BH CV ECHO MEAS - SV(MOD-SP4): 16.5 ML
BH CV ECHO MEASUREMENTS AVERAGE E/E' RATIO: 8.3
BH CV XLRA MEAS LEFT DIST CCA EDV: 20 CM/SEC
BH CV XLRA MEAS LEFT DIST CCA PSV: 65.2 CM/SEC
BH CV XLRA MEAS LEFT DIST ICA EDV: -39.2 CM/SEC
BH CV XLRA MEAS LEFT DIST ICA PSV: -118 CM/SEC
BH CV XLRA MEAS LEFT ICA/CCA RATIO: -1.81
BH CV XLRA MEAS LEFT MID ICA EDV: -35.8 CM/SEC
BH CV XLRA MEAS LEFT MID ICA PSV: -100.4 CM/SEC
BH CV XLRA MEAS LEFT PROX CCA EDV: 21.6 CM/SEC
BH CV XLRA MEAS LEFT PROX CCA PSV: 79.8 CM/SEC
BH CV XLRA MEAS LEFT PROX ECA EDV: -18.8 CM/SEC
BH CV XLRA MEAS LEFT PROX ECA PSV: -92.5 CM/SEC
BH CV XLRA MEAS LEFT PROX ICA EDV: -27.5 CM/SEC
BH CV XLRA MEAS LEFT PROX ICA PSV: -72.9 CM/SEC
BH CV XLRA MEAS LEFT VERTEBRAL A EDV: -12.9 CM/SEC
BH CV XLRA MEAS LEFT VERTEBRAL A PSV: -46.1 CM/SEC
BH CV XLRA MEAS RIGHT DIST CCA EDV: 23.6 CM/SEC
BH CV XLRA MEAS RIGHT DIST CCA PSV: 78.6 CM/SEC
BH CV XLRA MEAS RIGHT DIST ICA EDV: -37.3 CM/SEC
BH CV XLRA MEAS RIGHT DIST ICA PSV: -94.3 CM/SEC
BH CV XLRA MEAS RIGHT ICA/CCA RATIO: -1.28
BH CV XLRA MEAS RIGHT MID ICA EDV: -30.9 CM/SEC
BH CV XLRA MEAS RIGHT MID ICA PSV: -82 CM/SEC
BH CV XLRA MEAS RIGHT PROX CCA EDV: 25 CM/SEC
BH CV XLRA MEAS RIGHT PROX CCA PSV: 88.4 CM/SEC
BH CV XLRA MEAS RIGHT PROX ECA EDV: -15.7 CM/SEC
BH CV XLRA MEAS RIGHT PROX ECA PSV: -75.6 CM/SEC
BH CV XLRA MEAS RIGHT PROX ICA EDV: -33.4 CM/SEC
BH CV XLRA MEAS RIGHT PROX ICA PSV: -100.7 CM/SEC
BH CV XLRA MEAS RIGHT VERTEBRAL A EDV: -9.7 CM/SEC
BH CV XLRA MEAS RIGHT VERTEBRAL A PSV: -29.2 CM/SEC
LEFT ATRIUM VOLUME INDEX: 20.8 ML/M2
MAXIMAL PREDICTED HEART RATE: 151 BPM
STRESS TARGET HR: 128 BPM

## 2022-08-16 PROCEDURE — 93880 EXTRACRANIAL BILAT STUDY: CPT | Performed by: RADIOLOGY

## 2022-08-16 PROCEDURE — 93306 TTE W/DOPPLER COMPLETE: CPT

## 2022-08-16 PROCEDURE — 93880 EXTRACRANIAL BILAT STUDY: CPT

## 2022-08-16 PROCEDURE — 93306 TTE W/DOPPLER COMPLETE: CPT | Performed by: INTERNAL MEDICINE

## 2022-12-06 ENCOUNTER — OFFICE VISIT (OUTPATIENT)
Dept: CARDIOLOGY | Facility: CLINIC | Age: 72
End: 2022-12-06

## 2022-12-06 VITALS
HEART RATE: 56 BPM | BODY MASS INDEX: 24.26 KG/M2 | DIASTOLIC BLOOD PRESSURE: 78 MMHG | HEIGHT: 60 IN | SYSTOLIC BLOOD PRESSURE: 124 MMHG | WEIGHT: 123.6 LBS

## 2022-12-06 DIAGNOSIS — I10 ESSENTIAL HYPERTENSION: ICD-10-CM

## 2022-12-06 DIAGNOSIS — E78.00 HYPERCHOLESTEREMIA: ICD-10-CM

## 2022-12-06 DIAGNOSIS — R00.1 BRADYCARDIA, SINUS: Primary | ICD-10-CM

## 2022-12-06 DIAGNOSIS — I35.1 NONRHEUMATIC AORTIC VALVE INSUFFICIENCY: ICD-10-CM

## 2022-12-06 DIAGNOSIS — I34.0 NONRHEUMATIC MITRAL VALVE REGURGITATION: ICD-10-CM

## 2022-12-06 PROCEDURE — 99213 OFFICE O/P EST LOW 20 MIN: CPT | Performed by: NURSE PRACTITIONER

## 2022-12-06 NOTE — PROGRESS NOTES
Chief Complaint   Patient presents with   • Follow-up     Cardiac management   • Lab     Last labs 6-7 months ago per PCP.   • Med Refill     Needs refills on Lisinopril-90 day.     Subjective       Kenzie Escobar is a 72 y.o. female  with HTN, hyperlipidemia, palpitations, and hypothyroidism.  She was referred in February 2020 for dizziness after she was seen at PCP for HTN.  Holter stable. Carotid US showed no flow-limiting stenosis bilaterally.  Stress and echo revealed no ischemia, normal EF, mild MR and AI.  She had symptoms described as a TIA/visual changes in August 2021.  CT head and carotid ultrasound showed no acute changes. Another episode in 8/2022 after getting overheated and dehydrated.  Carotid US showed mild to moderate left carotid stenosis. Echo repeated showed AR mild to moderate.     She returns today for follow up visit with her . She denies chest pain, dyspnea, palpitations. No recurrent dizziness, no syncope. Overall, she is feeling well.        Cardiac History:    Past Surgical History:   Procedure Laterality Date   • CARDIOVASCULAR STRESS TEST  03/04/2020    R.Stress- 6 Min. 30 Secs. 7.0 METS. 92% THR. BP- 184/71. Negative.   • CONVERTED (HISTORICAL) HOLTER  01/17/2020    @ [facility]. [facility] 68 ..   • ECHO - CONVERTED  03/04/2020    EF 65%. Mild MR & AI.    • ECHO - CONVERTED  08/16/2022    EF 65%. Mild-Mod AI. Trace-Mild MR   • OTHER SURGICAL HISTORY  08/23/2021    CT head-negative   • US CAROTID UNILATERAL  03/05/2020    No sig lesions.   • US CAROTID UNILATERAL  08/23/2021    Mild plaque bilaterally     Current Outpatient Medications   Medication Sig Dispense Refill   • atorvastatin (LIPITOR) 10 MG tablet Take 10 mg by mouth Daily.     • levothyroxine (SYNTHROID, LEVOTHROID) 75 MCG tablet Take 75 mcg by mouth Daily.     • lisinopril (PRINIVIL,ZESTRIL) 10 MG tablet Take 1 tablet by mouth Daily. 90 tablet 3     No current facility-administered medications for this visit.     Other and Sulfa  antibiotics    Past Medical History:   Diagnosis Date   • Bradycardia    • H/O shoulder surgery 2014    right   • Hyperlipidemia    • Hypertension    • Hypothyroidism    • Nonrheumatic aortic valve insufficiency 2022     Social History     Socioeconomic History   • Marital status:    Tobacco Use   • Smoking status: Former     Packs/day: 0.50     Years: 45.00     Pack years: 22.50     Types: Cigarettes     Quit date: 2018     Years since quittin.9   • Smokeless tobacco: Never   Vaping Use   • Vaping Use: Never used   Substance and Sexual Activity   • Alcohol use: Not Currently     Comment: occasional glass of wine   • Drug use: Never   • Sexual activity: Defer     Partners: Male     Birth control/protection: Post-menopausal     Family History   Problem Relation Age of Onset   • Hypertension Mother    • Cerebral aneurysm Mother    • Heart disease Mother         Had a brain aneurysm.   • Failure to thrive Father    • Heart disease Sister 73   • Heart attack Paternal Grandmother 80   • Prostate cancer Paternal Grandfather    • Cancer Sister    • No Known Problems Son      Review of Systems   Constitutional: Negative for decreased appetite and malaise/fatigue.   HENT: Negative.    Eyes: Negative for blurred vision.   Cardiovascular: Negative for chest pain, dyspnea on exertion, leg swelling, palpitations and syncope.   Respiratory: Negative for shortness of breath and sleep disturbances due to breathing.    Endocrine: Negative.    Hematologic/Lymphatic: Negative for bleeding problem. Does not bruise/bleed easily.   Skin: Negative.    Musculoskeletal: Negative for falls and myalgias.   Gastrointestinal: Negative for abdominal pain, heartburn and melena.   Genitourinary: Negative for hematuria.   Neurological: Negative for dizziness and light-headedness.   Psychiatric/Behavioral: Negative for altered mental status.   Allergic/Immunologic: Negative.         Objective     /78 (BP Location: Left arm)  "  Pulse 56   Ht 152.4 cm (60\")   Wt 56.1 kg (123 lb 9.6 oz)   BMI 24.14 kg/m²     Vitals and nursing note reviewed.   Constitutional:       General: Not in acute distress.     Appearance: Well-developed. Not diaphoretic.   Eyes:      Pupils: Pupils are equal, round, and reactive to light.   HENT:      Head: Normocephalic.   Pulmonary:      Effort: Pulmonary effort is normal. No respiratory distress.      Breath sounds: Normal breath sounds.   Cardiovascular:      Normal rate. Regular rhythm.   Pulses:     Intact distal pulses.   Abdominal:      General: Bowel sounds are normal.      Palpations: Abdomen is soft.   Musculoskeletal: Normal range of motion.      Cervical back: Normal range of motion. Skin:     General: Skin is warm and dry.   Neurological:      Mental Status: Alert and oriented to person, place, and time.        Procedures          Problem List Items Addressed This Visit        Cardiac and Vasculature    Bradycardia, sinus - Primary    Hypercholesteremia    Essential hypertension    Nonrheumatic mitral valve regurgitation    Nonrheumatic aortic valve insufficiency      1.  HTN- well controlled with lisinopril 10 mg.  Continue the same.  Refill sent.  Limit sodium.  BMI is normal.     2.  Hyperlipidemia-Lipitor 10 mg for .  Tolerating well.  PCP follows labs.       3.  Aortic/mitral regurg- No recurrent dizziness, no dyspnea or chest pain. Recent echo reviewed with her.     4. Carotid stenosis- mild to moderate on the left. Will monitor.       BMI is within normal parameters. No other follow-up for BMI required.               Electronically signed by IRWIN Kuo,  December 9, 2022 12:16 EST  "

## 2022-12-09 PROBLEM — I35.1 NONRHEUMATIC AORTIC VALVE INSUFFICIENCY: Status: ACTIVE | Noted: 2022-12-09

## 2022-12-19 RX ORDER — LISINOPRIL 10 MG/1
10 TABLET ORAL DAILY
Qty: 90 TABLET | Refills: 3 | Status: SHIPPED | OUTPATIENT
Start: 2022-12-19

## 2023-06-30 ENCOUNTER — TELEPHONE (OUTPATIENT)
Dept: CARDIOLOGY | Facility: CLINIC | Age: 73
End: 2023-06-30

## 2023-06-30 NOTE — TELEPHONE ENCOUNTER
Attempted to contact patient concerning labs, unable to reach, left message for patient to call back.

## 2023-06-30 NOTE — TELEPHONE ENCOUNTER
Caller: Kenzie Escobar    Relationship: Self    Best call back number: [phone]    What is the best time to reach you: ANY    Who are you requesting to speak with (clinical staff, provider,  specific staff member): CLINICAL STAFF    What was the call regarding: PT IS WANTING LAB RESULTS. PT HAD LABS COMPLETED AT [facility] AND  STATES THEY SENT OVER RESULTS VIA FAXED 1 WEEK AGO.    Is it okay if the provider responds through [email or web address]: NO

## 2024-01-09 ENCOUNTER — OFFICE VISIT (OUTPATIENT)
Dept: CARDIOLOGY | Facility: CLINIC | Age: 74
End: 2024-01-09
Payer: MEDICARE

## 2024-01-09 VITALS
HEIGHT: 60 IN | SYSTOLIC BLOOD PRESSURE: 140 MMHG | BODY MASS INDEX: 24.94 KG/M2 | HEART RATE: 56 BPM | DIASTOLIC BLOOD PRESSURE: 72 MMHG | WEIGHT: 127 LBS

## 2024-01-09 DIAGNOSIS — R42 DIZZINESS: ICD-10-CM

## 2024-01-09 DIAGNOSIS — E78.00 HYPERCHOLESTEREMIA: ICD-10-CM

## 2024-01-09 DIAGNOSIS — I35.1 NONRHEUMATIC AORTIC VALVE INSUFFICIENCY: ICD-10-CM

## 2024-01-09 DIAGNOSIS — R00.1 BRADYCARDIA, SINUS: Primary | ICD-10-CM

## 2024-01-09 DIAGNOSIS — I34.0 NONRHEUMATIC MITRAL VALVE REGURGITATION: ICD-10-CM

## 2024-01-09 DIAGNOSIS — I10 ESSENTIAL HYPERTENSION: ICD-10-CM

## 2024-01-09 PROCEDURE — 99213 OFFICE O/P EST LOW 20 MIN: CPT | Performed by: NURSE PRACTITIONER

## 2024-01-09 PROCEDURE — 3078F DIAST BP <80 MM HG: CPT | Performed by: NURSE PRACTITIONER

## 2024-01-09 PROCEDURE — 3077F SYST BP >= 140 MM HG: CPT | Performed by: NURSE PRACTITIONER

## 2024-01-09 PROCEDURE — 1160F RVW MEDS BY RX/DR IN RCRD: CPT | Performed by: NURSE PRACTITIONER

## 2024-01-09 PROCEDURE — 1159F MED LIST DOCD IN RCRD: CPT | Performed by: NURSE PRACTITIONER

## 2024-01-09 RX ORDER — LEVOTHYROXINE SODIUM 0.1 MG/1
100 TABLET ORAL DAILY
COMMUNITY

## 2024-01-09 RX ORDER — ERGOCALCIFEROL 1.25 MG/1
50000 CAPSULE ORAL WEEKLY
COMMUNITY

## 2024-01-09 NOTE — PROGRESS NOTES
"Chief Complaint   Patient presents with    Follow-up     Cardiac management    Lab     Has copy of most recent labs. She is starting Metformin.     Med Refill     Needs refills on Lisinopril-90 day.     Subjective       Kenzie Escobar is a 73 y.o. female with HTN, hyperlipidemia, palpitations, and hypothyroidism.  She was referred in February 2020 for dizziness after she was seen at PCP for HTN.  Holter stable. Carotid US showed no flow-limiting stenosis bilaterally.  Stress and echo revealed no ischemia, normal EF, mild MR and AI.  She had symptoms described as a TIA/visual changes in August 2021.  CT head and carotid ultrasound showed no acute changes. Another episode in 8/2022 after getting overheated and dehydrated.  Carotid US showed mild to moderate left carotid stenosis. Echo repeated showed AR mild to moderate.  LDL improved from 166 to 78 with Lipitor 10 mg.      She returns today for follow up. Denies recurrent dizziness, TIA or syncope. She denies CP, dyspnea, palpitations. Weight is up a few pounds she attributes to \"holiday eating\". BP well controlled with lisinopril. Labs brought in today showed on 1/3/24: LDL 85, Tri 123, HDL 69, A1C 6.5%, GFR 69, normal CBC, Vit d 17. Vit d added.        Cardiac History:    Past Surgical History:   Procedure Laterality Date    CARDIOVASCULAR STRESS TEST  03/04/2020    R.Stress- 6 Min. 30 Secs. 7.0 METS. 92% THR. BP- 184/71. Negative.    CONVERTED (HISTORICAL) HOLTER  01/17/2020    @ [facility]. AV 70 ..    ECHO - CONVERTED  03/04/2020    EF 65%. Mild MR & AI.     ECHO - CONVERTED  08/16/2022    EF 65%. Mild-Mod AI. Trace-Mild MR    OTHER SURGICAL HISTORY  08/23/2021    CT head-negative    US CAROTID UNILATERAL  03/05/2020    No sig lesions.    US CAROTID UNILATERAL  08/23/2021    Mild plaque bilaterally    US CAROTID UNILATERAL  08/16/2022    Mild to mod LICA, normal IMMANUEL       Current Outpatient Medications   Medication Sig Dispense Refill    aspirin 81 MG EC tablet " Take 1 tablet by mouth 2 (Two) Times a Week.      atorvastatin (LIPITOR) 10 MG tablet Take 1 tablet by mouth Daily.      levothyroxine (SYNTHROID, LEVOTHROID) 100 MCG tablet Take 1 tablet by mouth Daily.      lisinopril (PRINIVIL,ZESTRIL) 10 MG tablet Take 1 tablet by mouth Daily. 90 tablet 3    metFORMIN (GLUCOPHAGE) 500 MG tablet Take 1 tablet by mouth 2 (Two) Times a Day With Meals.      vitamin D (ERGOCALCIFEROL) 1.25 MG (86505 UT) capsule capsule Take 1 capsule by mouth 1 (One) Time Per Week.       No current facility-administered medications for this visit.     Other and Sulfa antibiotics    Past Medical History:   Diagnosis Date    Bradycardia     H/O shoulder surgery     right    Hyperlipidemia     Hypertension     Hypothyroidism     Nonrheumatic aortic valve insufficiency 2022    Prediabetes     Vitamin D deficiency      Social History     Socioeconomic History    Marital status:    Tobacco Use    Smoking status: Former     Packs/day: 0.50     Years: 45.00     Additional pack years: 0.00     Total pack years: 22.50     Types: Cigarettes     Quit date: 2018     Years since quittin.0    Smokeless tobacco: Never   Vaping Use    Vaping Use: Never used   Substance and Sexual Activity    Alcohol use: Yes     Comment: occasional glass of wine    Drug use: Never    Sexual activity: Defer     Partners: Male     Birth control/protection: Post-menopausal     Family History   Problem Relation Age of Onset    Hypertension Mother     Cerebral aneurysm Mother     Heart disease Mother         Had a brain aneurysm.    Failure to thrive Father     Heart disease Sister 73    Heart attack Paternal Grandmother 80    Prostate cancer Paternal Grandfather     Cancer Sister     No Known Problems Son      Review of Systems   Constitutional: Positive for weight gain (+10). Negative for decreased appetite and malaise/fatigue.   HENT: Negative.     Eyes:  Negative for blurred vision.   Cardiovascular:  Negative  "for chest pain, dyspnea on exertion, leg swelling, palpitations and syncope.   Respiratory:  Negative for shortness of breath and sleep disturbances due to breathing.    Endocrine: Negative.    Hematologic/Lymphatic: Negative for bleeding problem. Does not bruise/bleed easily.   Skin: Negative.    Musculoskeletal:  Negative for falls and myalgias.   Gastrointestinal:  Negative for abdominal pain, heartburn and melena.   Genitourinary:  Negative for hematuria.   Neurological:  Negative for dizziness and light-headedness.   Psychiatric/Behavioral:  Negative for altered mental status.    Allergic/Immunologic: Negative.       Objective     /72 (BP Location: Left arm)   Pulse 56   Ht 152.4 cm (60\")   Wt 57.6 kg (127 lb)   BMI 24.80 kg/m²     Vitals and nursing note reviewed.   Constitutional:       General: Not in acute distress.     Appearance: Well-developed. Not diaphoretic.   Eyes:      Pupils: Pupils are equal, round, and reactive to light.   HENT:      Head: Normocephalic.   Pulmonary:      Effort: Pulmonary effort is normal. No respiratory distress.      Breath sounds: Normal breath sounds.   Cardiovascular:      Normal rate. Regular rhythm.   Pulses:     Intact distal pulses.   Edema:     Peripheral edema absent.   Abdominal:      General: Bowel sounds are normal.      Palpations: Abdomen is soft.   Musculoskeletal: Normal range of motion.      Cervical back: Normal range of motion. Skin:     General: Skin is warm and dry.   Neurological:      Mental Status: Alert and oriented to person, place, and time.        Procedures          Problem List Items Addressed This Visit          Cardiac and Vasculature    Bradycardia, sinus - Primary    Hypercholesteremia    Essential hypertension    Nonrheumatic mitral valve regurgitation    Nonrheumatic aortic valve insufficiency       Symptoms and Signs    Dizziness      HTN  -well controlled with lisinopril 10 mg  -continue same plan   -limit na   -BMI is normal.   "   Hyperlipidemia  -LDL improved significantly with Lipitor 10 mg  -LDL at goal   -thank you for providing labs      Aortic/mitral regurg  -echo 8/2022 showed mild to mod AI  -remains asymptomatic, continue to montior  -repeat echo next visit for 2 year follow up     Carotid stenosis  -mild to moderate of LICA, 8/2022  -repeat carotid US next visit   -continue asp, statin       BMI is within normal parameters. No other follow-up for BMI required.               Electronically signed by IRWIN Kuo,  January 12, 2024 10:30 EST

## 2024-07-24 ENCOUNTER — OFFICE VISIT (OUTPATIENT)
Dept: CARDIOLOGY | Facility: CLINIC | Age: 74
End: 2024-07-24
Payer: MEDICARE

## 2024-07-24 VITALS
HEART RATE: 58 BPM | HEIGHT: 60 IN | WEIGHT: 119.6 LBS | BODY MASS INDEX: 23.48 KG/M2 | SYSTOLIC BLOOD PRESSURE: 110 MMHG | DIASTOLIC BLOOD PRESSURE: 60 MMHG

## 2024-07-24 DIAGNOSIS — E78.00 HYPERCHOLESTEREMIA: ICD-10-CM

## 2024-07-24 DIAGNOSIS — I35.1 NONRHEUMATIC AORTIC VALVE INSUFFICIENCY: ICD-10-CM

## 2024-07-24 DIAGNOSIS — R01.1 MURMUR, CARDIAC: ICD-10-CM

## 2024-07-24 DIAGNOSIS — I34.0 NONRHEUMATIC MITRAL VALVE REGURGITATION: ICD-10-CM

## 2024-07-24 DIAGNOSIS — I10 ESSENTIAL HYPERTENSION: ICD-10-CM

## 2024-07-24 DIAGNOSIS — I65.22 STENOSIS OF LEFT CAROTID ARTERY: Primary | ICD-10-CM

## 2024-07-24 DIAGNOSIS — R42 DIZZINESS: ICD-10-CM

## 2024-07-24 DIAGNOSIS — R00.1 BRADYCARDIA, SINUS: ICD-10-CM

## 2024-07-24 PROCEDURE — 3074F SYST BP LT 130 MM HG: CPT | Performed by: NURSE PRACTITIONER

## 2024-07-24 PROCEDURE — 99214 OFFICE O/P EST MOD 30 MIN: CPT | Performed by: NURSE PRACTITIONER

## 2024-07-24 PROCEDURE — 1159F MED LIST DOCD IN RCRD: CPT | Performed by: NURSE PRACTITIONER

## 2024-07-24 PROCEDURE — 1160F RVW MEDS BY RX/DR IN RCRD: CPT | Performed by: NURSE PRACTITIONER

## 2024-07-24 PROCEDURE — 3078F DIAST BP <80 MM HG: CPT | Performed by: NURSE PRACTITIONER

## 2024-07-24 RX ORDER — ATORVASTATIN CALCIUM 10 MG/1
10 TABLET, FILM COATED ORAL DAILY
Qty: 90 TABLET | Refills: 3 | Status: SHIPPED | OUTPATIENT
Start: 2024-07-24

## 2024-07-24 RX ORDER — LISINOPRIL 10 MG/1
10 TABLET ORAL DAILY
Qty: 90 TABLET | Refills: 3 | Status: SHIPPED | OUTPATIENT
Start: 2024-07-24

## 2024-07-24 NOTE — PROGRESS NOTES
Chief Complaint   Patient presents with    Follow-up     Cardiac management - Patient has no complaints. Patient denies any chest pain, palpitations, shortness of breath, or leg swelling.    Labs     Patient has had recent lab work done from PCP.     Med Refill     Patient needs medication refills.      Subjective       Kenzie Escobar is a 73 y.o. female with HTN, hyperlipidemia, palpitations, and hypothyroidism.  She was referred in February 2020 for dizziness after she was seen at PCP for HTN.  Holter stable. Carotid US showed no flow-limiting stenosis bilaterally.  Stress and echo revealed no ischemia, normal EF, mild MR and AI.  She had symptoms described as a TIA/visual changes in August 2021.  CT head and carotid ultrasound showed no acute changes. Another episode in 8/2022 after getting overheated and dehydrated.  Carotid US showed mild to moderate left carotid stenosis. Echo repeated showed AR mild to moderate.  LDL improved from 166 to 78 with Lipitor 10 mg.      She returns today for regular follow-up.  Denies cardiac symptoms.  No chest pain, shortness of breath, palpitations.  No recurrent TIA symptoms.  Recent labs showed H/H 13/40, platelets 310, glucose 105, GFR 82, , , HDL 55, LDL 82, A1c 6.1%, vitamin D 42.  TSH low at 0.014.  Levothyroxine dose reduced.         Cardiac History:    Past Surgical History:   Procedure Laterality Date    CARDIOVASCULAR STRESS TEST  03/04/2020    R.Stress- 6 Min. 30 Secs. 7.0 METS. 92% THR. BP- 184/71. Negative.    CONVERTED (HISTORICAL) HOLTER  01/17/2020    @ [facility]. [facility] 70 ..    ECHO - CONVERTED  03/04/2020    EF 65%. Mild MR & AI.     ECHO - CONVERTED  08/16/2022    EF 65%. Mild-Mod AI. Trace-Mild MR    OTHER SURGICAL HISTORY  08/23/2021    CT head-negative    US CAROTID UNILATERAL  03/05/2020    No sig lesions.    US CAROTID UNILATERAL  08/23/2021    Mild plaque bilaterally    US CAROTID UNILATERAL  08/16/2022    Mild to mod LICA, normal IMMANUEL      Current Outpatient Medications   Medication Sig Dispense Refill    aspirin 81 MG EC tablet Take 1 tablet by mouth 2 (Two) Times a Week.      atorvastatin (LIPITOR) 10 MG tablet Take 1 tablet by mouth Daily. 90 tablet 3    Blood Pressure Monitoring (Blood Pressure Digital Soln) kit       levothyroxine (SYNTHROID, LEVOTHROID) 100 MCG tablet Take 1 tablet by mouth Daily.      lisinopril (PRINIVIL,ZESTRIL) 10 MG tablet Take 1 tablet by mouth Daily. 90 tablet 3    vitamin D (ERGOCALCIFEROL) 1.25 MG (90184 UT) capsule capsule Take 1 capsule by mouth 1 (One) Time Per Week.       No current facility-administered medications for this visit.     Other and Sulfa antibiotics    Past Medical History:   Diagnosis Date    Bradycardia     H/O shoulder surgery     right    Hyperlipidemia     Hypertension     Hypothyroidism     Nonrheumatic aortic valve insufficiency 2022    Osteoporosis     Prediabetes     Vitamin D deficiency      Social History     Socioeconomic History    Marital status:    Tobacco Use    Smoking status: Former     Current packs/day: 0.00     Average packs/day: 0.5 packs/day for 45.0 years (22.5 ttl pk-yrs)     Types: Cigarettes     Start date: 1973     Quit date: 2018     Years since quittin.5    Smokeless tobacco: Never   Vaping Use    Vaping status: Never Used   Substance and Sexual Activity    Alcohol use: Yes     Comment: occasional glass of wine    Drug use: Never    Sexual activity: Defer     Partners: Male     Birth control/protection: Post-menopausal     Family History   Problem Relation Age of Onset    Hypertension Mother     Cerebral aneurysm Mother     Heart disease Mother         Had a brain aneurysm.    Failure to thrive Father     Heart disease Sister 73    Heart attack Paternal Grandmother 80    Prostate cancer Paternal Grandfather     Cancer Sister     No Known Problems Son      Review of Systems   Constitutional: Positive for weight loss (-8). Negative for  "decreased appetite and malaise/fatigue.   HENT: Negative.     Eyes:  Negative for blurred vision.   Cardiovascular:  Negative for chest pain, dyspnea on exertion, leg swelling, palpitations and syncope.   Respiratory:  Negative for shortness of breath and sleep disturbances due to breathing.    Endocrine: Negative.    Hematologic/Lymphatic: Negative for bleeding problem. Does not bruise/bleed easily.   Skin: Negative.    Musculoskeletal:  Negative for falls and myalgias.   Gastrointestinal:  Negative for abdominal pain, heartburn and melena.   Genitourinary:  Negative for hematuria.   Neurological:  Negative for dizziness and light-headedness.   Psychiatric/Behavioral:  Negative for altered mental status.    Allergic/Immunologic: Negative.       Objective     /60 (BP Location: Right arm, Patient Position: Sitting, Cuff Size: Adult)   Pulse 58   Ht 152.4 cm (60\")   Wt 54.3 kg (119 lb 9.6 oz)   BMI 23.36 kg/m²     Vitals and nursing note reviewed.   Constitutional:       General: Not in acute distress.     Appearance: Well-developed. Not diaphoretic.   Eyes:      Pupils: Pupils are equal, round, and reactive to light.   HENT:      Head: Normocephalic.   Pulmonary:      Effort: Pulmonary effort is normal. No respiratory distress.      Breath sounds: Normal breath sounds.   Cardiovascular:      Normal rate. Regular rhythm.   Pulses:     Intact distal pulses.   Edema:     Peripheral edema absent.   Abdominal:      General: Bowel sounds are normal.      Palpations: Abdomen is soft.   Musculoskeletal: Normal range of motion.      Cervical back: Normal range of motion. Skin:     General: Skin is warm and dry.   Neurological:      Mental Status: Alert and oriented to person, place, and time.          ECG 12 Lead    Date/Time: 7/25/2024 2:27 PM  Performed by: Neeta Laguerre APRN    Authorized by: Neeta Laguerre APRN  Comparison: compared with previous ECG   Similar to previous ECG  Rhythm: sinus bradycardia  Rate: " bradycardic  BPM: 58  ST Segments: ST segments normal    Clinical impression: normal ECG              Problem List Items Addressed This Visit          Cardiac and Vasculature    Bradycardia, sinus    Relevant Orders    ECG 12 Lead    Murmur, cardiac    Hypercholesteremia    Relevant Medications    atorvastatin (LIPITOR) 10 MG tablet    Essential hypertension    Relevant Medications    lisinopril (PRINIVIL,ZESTRIL) 10 MG tablet    Nonrheumatic mitral valve regurgitation    Nonrheumatic aortic valve insufficiency       Symptoms and Signs    Dizziness    Relevant Orders    US Carotid Bilateral     Other Visit Diagnoses       Stenosis of left carotid artery    -  Primary    Relevant Orders    US Carotid Bilateral           HTN  -EKG shows sinus bradycardia at 58 bpm, no ST changes.  -Well-controlled  -Continue lisinopril 10 mg    Hyperlipidemia  -LDL remains well-controlled with Lipitor  -Continue same plan    Aortic/mitral regurg  -echo 8/2022 showed mild to mod AI  -remains asymptomatic, continue to montior  -Since she has no symptoms, will continue to monitor and repeat echo in the next year, sooner if symptoms develop       Carotid stenosis  -mild to moderate of LICA, 8/2022  -repeat carotid US   -continue asp, statin      BMI is within normal parameters. No other follow-up for BMI required.            Electronically signed by IRWIN Kuo,  July 30, 2024 11:08 EDT

## 2024-07-24 NOTE — LETTER
July 30, 2024       No Recipients    Patient: Kenzie Escobar   YOB: 1950   Date of Visit: 7/24/2024       Dear IRWIN Alejo    Kenzie Escobar was in my office today. Below is a copy of my note.    If you have questions, please do not hesitate to call me. I look forward to following Kenzie along with you.         Sincerely,        IRWIN Kuo        CC:   No Recipients    Chief Complaint   Patient presents with   • Follow-up     Cardiac management - Patient has no complaints. Patient denies any chest pain, palpitations, shortness of breath, or leg swelling.   • Labs     Patient has had recent lab work done from PCP.    • Med Refill     Patient needs medication refills.      Subjective      Kenzie Escobar is a 73 y.o. female with HTN, hyperlipidemia, palpitations, and hypothyroidism.  She was referred in February 2020 for dizziness after she was seen at PCP for HTN.  Holter stable. Carotid US showed no flow-limiting stenosis bilaterally.  Stress and echo revealed no ischemia, normal EF, mild MR and AI.  She had symptoms described as a TIA/visual changes in August 2021.  CT head and carotid ultrasound showed no acute changes. Another episode in 8/2022 after getting overheated and dehydrated.  Carotid US showed mild to moderate left carotid stenosis. Echo repeated showed AR mild to moderate.  LDL improved from 166 to 78 with Lipitor 10 mg.      She returns today for regular follow-up.  Denies cardiac symptoms.  No chest pain, shortness of breath, palpitations.  No recurrent TIA symptoms.  Recent labs showed H/H 13/40, platelets 310, glucose 105, GFR 82, , , HDL 55, LDL 82, A1c 6.1%, vitamin D 42.  TSH low at 0.014.  Levothyroxine dose reduced.         Cardiac History:    Past Surgical History:   Procedure Laterality Date   • CARDIOVASCULAR STRESS TEST  03/04/2020    R.Stress- 6 Min. 30 Secs. 7.0 METS. 92% THR. BP- 184/71. Negative.   • CONVERTED (HISTORICAL) HOLTER  01/17/2020    @ [facility]. [facility]  70 ..   • ECHO - CONVERTED  2020    EF 65%. Mild MR & AI.    • ECHO - CONVERTED  2022    EF 65%. Mild-Mod AI. Trace-Mild MR   • OTHER SURGICAL HISTORY  2021    CT head-negative   • US CAROTID UNILATERAL  2020    No sig lesions.   • US CAROTID UNILATERAL  2021    Mild plaque bilaterally   • US CAROTID UNILATERAL  2022    Mild to mod LICA, normal IMMANUEL     Current Outpatient Medications   Medication Sig Dispense Refill   • aspirin 81 MG EC tablet Take 1 tablet by mouth 2 (Two) Times a Week.     • atorvastatin (LIPITOR) 10 MG tablet Take 1 tablet by mouth Daily. 90 tablet 3   • Blood Pressure Monitoring (Blood Pressure Digital Soln) kit      • levothyroxine (SYNTHROID, LEVOTHROID) 100 MCG tablet Take 1 tablet by mouth Daily.     • lisinopril (PRINIVIL,ZESTRIL) 10 MG tablet Take 1 tablet by mouth Daily. 90 tablet 3   • vitamin D (ERGOCALCIFEROL) 1.25 MG (20755 UT) capsule capsule Take 1 capsule by mouth 1 (One) Time Per Week.       No current facility-administered medications for this visit.     Other and Sulfa antibiotics    Past Medical History:   Diagnosis Date   • Bradycardia    • H/O shoulder surgery     right   • Hyperlipidemia    • Hypertension    • Hypothyroidism    • Nonrheumatic aortic valve insufficiency 2022   • Osteoporosis    • Prediabetes    • Vitamin D deficiency      Social History     Socioeconomic History   • Marital status:    Tobacco Use   • Smoking status: Former     Current packs/day: 0.00     Average packs/day: 0.5 packs/day for 45.0 years (22.5 ttl pk-yrs)     Types: Cigarettes     Start date: 1973     Quit date: 2018     Years since quittin.5   • Smokeless tobacco: Never   Vaping Use   • Vaping status: Never Used   Substance and Sexual Activity   • Alcohol use: Yes     Comment: occasional glass of wine   • Drug use: Never   • Sexual activity: Defer     Partners: Male     Birth control/protection: Post-menopausal     Family  "History   Problem Relation Age of Onset   • Hypertension Mother    • Cerebral aneurysm Mother    • Heart disease Mother         Had a brain aneurysm.   • Failure to thrive Father    • Heart disease Sister 73   • Heart attack Paternal Grandmother 80   • Prostate cancer Paternal Grandfather    • Cancer Sister    • No Known Problems Son      Review of Systems   Constitutional: Positive for weight loss (-8). Negative for decreased appetite and malaise/fatigue.   HENT: Negative.     Eyes:  Negative for blurred vision.   Cardiovascular:  Negative for chest pain, dyspnea on exertion, leg swelling, palpitations and syncope.   Respiratory:  Negative for shortness of breath and sleep disturbances due to breathing.    Endocrine: Negative.    Hematologic/Lymphatic: Negative for bleeding problem. Does not bruise/bleed easily.   Skin: Negative.    Musculoskeletal:  Negative for falls and myalgias.   Gastrointestinal:  Negative for abdominal pain, heartburn and melena.   Genitourinary:  Negative for hematuria.   Neurological:  Negative for dizziness and light-headedness.   Psychiatric/Behavioral:  Negative for altered mental status.    Allergic/Immunologic: Negative.       Objective    /60 (BP Location: Right arm, Patient Position: Sitting, Cuff Size: Adult)   Pulse 58   Ht 152.4 cm (60\")   Wt 54.3 kg (119 lb 9.6 oz)   BMI 23.36 kg/m²     Vitals and nursing note reviewed.   Constitutional:       General: Not in acute distress.     Appearance: Well-developed. Not diaphoretic.   Eyes:      Pupils: Pupils are equal, round, and reactive to light.   HENT:      Head: Normocephalic.   Pulmonary:      Effort: Pulmonary effort is normal. No respiratory distress.      Breath sounds: Normal breath sounds.   Cardiovascular:      Normal rate. Regular rhythm.   Pulses:     Intact distal pulses.   Edema:     Peripheral edema absent.   Abdominal:      General: Bowel sounds are normal.      Palpations: Abdomen is soft.   Musculoskeletal: " Normal range of motion.      Cervical back: Normal range of motion. Skin:     General: Skin is warm and dry.   Neurological:      Mental Status: Alert and oriented to person, place, and time.          ECG 12 Lead    Date/Time: 7/25/2024 2:27 PM  Performed by: Neeta Laguerre APRN    Authorized by: Neeta Laguerre APRN  Comparison: compared with previous ECG   Similar to previous ECG  Rhythm: sinus bradycardia  Rate: bradycardic  BPM: 58  ST Segments: ST segments normal    Clinical impression: normal ECG              Problem List Items Addressed This Visit          Cardiac and Vasculature    Bradycardia, sinus    Relevant Orders    ECG 12 Lead    Murmur, cardiac    Hypercholesteremia    Relevant Medications    atorvastatin (LIPITOR) 10 MG tablet    Essential hypertension    Relevant Medications    lisinopril (PRINIVIL,ZESTRIL) 10 MG tablet    Nonrheumatic mitral valve regurgitation    Nonrheumatic aortic valve insufficiency       Symptoms and Signs    Dizziness    Relevant Orders    US Carotid Bilateral     Other Visit Diagnoses       Stenosis of left carotid artery    -  Primary    Relevant Orders    US Carotid Bilateral           HTN  -EKG shows sinus bradycardia at 58 bpm, no ST changes.  -Well-controlled  -Continue lisinopril 10 mg    Hyperlipidemia  -LDL remains well-controlled with Lipitor  -Continue same plan    Aortic/mitral regurg  -echo 8/2022 showed mild to mod AI  -remains asymptomatic, continue to montior  -Since she has no symptoms, will continue to monitor and repeat echo in the next year, sooner if symptoms develop       Carotid stenosis  -mild to moderate of LICA, 8/2022  -repeat carotid US   -continue asp, statin      BMI is within normal parameters. No other follow-up for BMI required.            Electronically signed by IRWIN Kuo,  July 30, 2024 11:08 EDT

## 2024-07-25 PROCEDURE — 93000 ELECTROCARDIOGRAM COMPLETE: CPT | Performed by: NURSE PRACTITIONER

## 2025-07-08 ENCOUNTER — OFFICE VISIT (OUTPATIENT)
Dept: CARDIOLOGY | Facility: CLINIC | Age: 75
End: 2025-07-08
Payer: MEDICARE

## 2025-07-08 VITALS
DIASTOLIC BLOOD PRESSURE: 70 MMHG | HEART RATE: 56 BPM | HEIGHT: 60 IN | SYSTOLIC BLOOD PRESSURE: 118 MMHG | BODY MASS INDEX: 25.25 KG/M2 | WEIGHT: 128.6 LBS

## 2025-07-08 DIAGNOSIS — R00.1 BRADYCARDIA, SINUS: Primary | ICD-10-CM

## 2025-07-08 DIAGNOSIS — I35.1 NONRHEUMATIC AORTIC VALVE INSUFFICIENCY: ICD-10-CM

## 2025-07-08 DIAGNOSIS — E03.8 OTHER SPECIFIED HYPOTHYROIDISM: ICD-10-CM

## 2025-07-08 DIAGNOSIS — I34.0 NONRHEUMATIC MITRAL VALVE REGURGITATION: ICD-10-CM

## 2025-07-08 DIAGNOSIS — I10 ESSENTIAL HYPERTENSION: ICD-10-CM

## 2025-07-08 DIAGNOSIS — E78.00 HYPERCHOLESTEREMIA: ICD-10-CM

## 2025-07-08 PROCEDURE — 99214 OFFICE O/P EST MOD 30 MIN: CPT | Performed by: NURSE PRACTITIONER

## 2025-07-08 PROCEDURE — 1159F MED LIST DOCD IN RCRD: CPT | Performed by: NURSE PRACTITIONER

## 2025-07-08 PROCEDURE — 3074F SYST BP LT 130 MM HG: CPT | Performed by: NURSE PRACTITIONER

## 2025-07-08 PROCEDURE — 3078F DIAST BP <80 MM HG: CPT | Performed by: NURSE PRACTITIONER

## 2025-07-08 PROCEDURE — 1160F RVW MEDS BY RX/DR IN RCRD: CPT | Performed by: NURSE PRACTITIONER

## 2025-07-08 RX ORDER — LISINOPRIL 10 MG/1
10 TABLET ORAL DAILY
Qty: 90 TABLET | Refills: 3 | Status: SHIPPED | OUTPATIENT
Start: 2025-07-08

## 2025-07-08 RX ORDER — LEVOTHYROXINE SODIUM 75 UG/1
75 TABLET ORAL
COMMUNITY

## 2025-07-08 RX ORDER — ATORVASTATIN CALCIUM 10 MG/1
10 TABLET, FILM COATED ORAL DAILY
Qty: 90 TABLET | Refills: 3 | Status: SHIPPED | OUTPATIENT
Start: 2025-07-08

## 2025-07-08 NOTE — PROGRESS NOTES
Chief Complaint   Patient presents with    Follow-up     Cardiac management    Lab     Last labs on Monday per PCP.    Med Refill     Needs refills on cardiac medications-90 day   Subjective   HPI    Kenzie Escobar is a 74 y.o. female with HTN, hyperlipidemia, palpitations, and hypothyroidism referred February 2020 for dizziness after she was seen at PCP for HTN.  Holter stable. Carotid US showed no flow-limiting stenosis bilaterally.  Stress and echo revealed no ischemia, normal EF, mild MR and AI.  She had symptoms described as a TIA/visual changes in August 2021.  CT head and carotid US showed no acute changes. Another episode in 8/2022 after getting overheated and dehydrated.  Carotid US showed mild to moderate left carotid stenosis. Echo repeated showed AR mild to moderate.  LDL improved from 166 to 78 with Lipitor 10 mg.       She returns today for regular follow-up. Denies cardiac symptoms. No dizziness, no TIA, no CP, SOB, palpitations. Having difficulty getting her thyroid regulated. Planning to see Dr. Oliver. Labs done this week with PCP, she plans to bring copy here.      Cardiac History:    Past Surgical History:   Procedure Laterality Date    CARDIOVASCULAR STRESS TEST  03/04/2020    R.Stress- 6 Min. 30 Secs. 7.0 METS. 92% THR. BP- 184/71. Negative.    CONVERTED (HISTORICAL) HOLTER  01/17/2020    @ [facility]. AVG 68 ..    ECHO - CONVERTED  03/04/2020    EF 65%. Mild MR & AI.     ECHO - CONVERTED  08/16/2022    EF 65%. Mild-Mod AI. Trace-Mild MR    OTHER SURGICAL HISTORY  08/23/2021    CT head-negative    US CAROTID UNILATERAL  03/05/2020    No sig lesions.    US CAROTID UNILATERAL  08/23/2021    Mild plaque bilaterally    US CAROTID UNILATERAL  08/16/2022    Mild to mod LICA, normal IMMANUEL     Current Outpatient Medications   Medication Sig Dispense Refill    atorvastatin (LIPITOR) 10 MG tablet Take 1 tablet by mouth Daily. 90 tablet 3    Blood Pressure Monitoring (Blood Pressure Digital Soln) kit        levothyroxine (SYNTHROID, LEVOTHROID) 75 MCG tablet Take 1 tablet by mouth Every Morning.      lisinopril (PRINIVIL,ZESTRIL) 10 MG tablet Take 1 tablet by mouth Daily. 90 tablet 3    vitamin D (ERGOCALCIFEROL) 1.25 MG (35543 UT) capsule capsule Take 1 capsule by mouth 1 (One) Time Per Week.       No current facility-administered medications for this visit.     Other and Sulfa antibiotics    Past Medical History:   Diagnosis Date    Bradycardia     H/O shoulder surgery 2014    right    Hyperlipidemia     Hypertension     Hypothyroidism     Nonrheumatic aortic valve insufficiency 2022    Osteoporosis     Prediabetes     Vitamin D deficiency      Social History     Socioeconomic History    Marital status:    Tobacco Use    Smoking status: Former     Current packs/day: 0.00     Average packs/day: 0.5 packs/day for 45.0 years (22.5 ttl pk-yrs)     Types: Cigarettes     Start date: 1973     Quit date: 2018     Years since quittin.5    Smokeless tobacco: Never   Vaping Use    Vaping status: Never Used   Substance and Sexual Activity    Alcohol use: Not Currently     Comment: occasional glass of wine    Drug use: Never    Sexual activity: Not Currently     Partners: Male     Birth control/protection: Post-menopausal     Family History   Problem Relation Age of Onset    Hypertension Mother     Cerebral aneurysm Mother     Heart disease Mother         Had a brain aneurysm.    Failure to thrive Father     Heart disease Sister 73        Open heart surgery    Heart attack Paternal Grandmother 80    Prostate cancer Paternal Grandfather     Cancer Sister     No Known Problems Son      Review of Systems   Constitutional: Positive for weight gain. Negative for decreased appetite and malaise/fatigue.   HENT: Negative.     Eyes:  Negative for blurred vision.   Cardiovascular:  Negative for chest pain, dyspnea on exertion, leg swelling, palpitations and syncope.   Respiratory:  Negative for shortness of breath  "and sleep disturbances due to breathing.    Endocrine: Negative.    Hematologic/Lymphatic: Negative for bleeding problem. Does not bruise/bleed easily.   Skin: Negative.    Musculoskeletal:  Negative for falls and myalgias.   Gastrointestinal:  Negative for abdominal pain, heartburn and melena.   Genitourinary:  Negative for hematuria.   Neurological:  Negative for dizziness and light-headedness.   Psychiatric/Behavioral:  Negative for altered mental status.    Allergic/Immunologic: Negative.       Objective     /70 (BP Location: Left arm, Patient Position: Sitting)   Pulse 56   Ht 152.4 cm (60\")   Wt 58.3 kg (128 lb 9.6 oz)   BMI 25.12 kg/m²     Vitals and nursing note reviewed.   Constitutional:       General: Not in acute distress.     Appearance: Well-developed. Not diaphoretic.   Eyes:      Pupils: Pupils are equal, round, and reactive to light.   HENT:      Head: Normocephalic.   Pulmonary:      Effort: Pulmonary effort is normal. No respiratory distress.      Breath sounds: Normal breath sounds.   Cardiovascular:      Normal rate. Regular rhythm.   Pulses:     Intact distal pulses.   Edema:     Peripheral edema absent.   Abdominal:      General: Bowel sounds are normal.      Palpations: Abdomen is soft.   Musculoskeletal: Normal range of motion.      Cervical back: Normal range of motion. Skin:     General: Skin is warm and dry.   Neurological:      Mental Status: Alert and oriented to person, place, and time.        Procedures          Problem List Items Addressed This Visit          Cardiac and Vasculature    Bradycardia, sinus - Primary    Hypercholesteremia    Relevant Medications    atorvastatin (LIPITOR) 10 MG tablet    Essential hypertension    Relevant Medications    lisinopril (PRINIVIL,ZESTRIL) 10 MG tablet    Nonrheumatic mitral valve regurgitation    Nonrheumatic aortic valve insufficiency       Endocrine and Metabolic    Hypothyroidism    Relevant Medications    levothyroxine " (SYNTHROID, LEVOTHROID) 75 MCG tablet      HTN  -well controlled with lisinopril   -continue same plan, refill sent     Hypothyroidism  -TSH fluctuating  -on levothyroxine 75 mcg  -planning to see endocrinologist, Dr. Oliver     Hyperlipidemia  -managed with atorvastatin, LDL improved to 78  -pt will bring most recent reports    MR, AI, mild   -clinically stable, no significant murmur auscultated  -no new testing warranted at this time     Cardiac status appears stable. FU 6 months, sooner if she develops new or worsening symptoms.     BMI is >= 25 and <30. (Overweight) The following options were offered after discussion;: nutrition counseling/recommendations            Electronically signed by IRWIN Kuo,  July 9, 2025 11:15 EDT